# Patient Record
Sex: FEMALE | Race: WHITE | NOT HISPANIC OR LATINO | Employment: FULL TIME | URBAN - METROPOLITAN AREA
[De-identification: names, ages, dates, MRNs, and addresses within clinical notes are randomized per-mention and may not be internally consistent; named-entity substitution may affect disease eponyms.]

---

## 2017-01-28 ENCOUNTER — COMMUNICATION - HEALTHEAST (OUTPATIENT)
Dept: FAMILY MEDICINE | Facility: CLINIC | Age: 32
End: 2017-01-28

## 2017-01-28 DIAGNOSIS — G47.00 INSOMNIA: ICD-10-CM

## 2017-02-20 ENCOUNTER — OFFICE VISIT - HEALTHEAST (OUTPATIENT)
Dept: FAMILY MEDICINE | Facility: CLINIC | Age: 32
End: 2017-02-20

## 2017-02-20 DIAGNOSIS — G47.00 INSOMNIA: ICD-10-CM

## 2017-02-20 DIAGNOSIS — M71.9 BURSITIS: ICD-10-CM

## 2017-02-20 ASSESSMENT — MIFFLIN-ST. JEOR: SCORE: 1477.86

## 2017-03-29 ENCOUNTER — COMMUNICATION - HEALTHEAST (OUTPATIENT)
Dept: FAMILY MEDICINE | Facility: CLINIC | Age: 32
End: 2017-03-29

## 2017-03-29 DIAGNOSIS — F41.9 ANXIETY: ICD-10-CM

## 2017-03-29 DIAGNOSIS — G47.00 INSOMNIA: ICD-10-CM

## 2017-06-03 ENCOUNTER — COMMUNICATION - HEALTHEAST (OUTPATIENT)
Dept: FAMILY MEDICINE | Facility: CLINIC | Age: 32
End: 2017-06-03

## 2017-06-03 DIAGNOSIS — G47.00 INSOMNIA: ICD-10-CM

## 2017-06-28 ENCOUNTER — OFFICE VISIT - HEALTHEAST (OUTPATIENT)
Dept: FAMILY MEDICINE | Facility: CLINIC | Age: 32
End: 2017-06-28

## 2017-06-28 DIAGNOSIS — Z91.09 ENVIRONMENTAL ALLERGIES: ICD-10-CM

## 2017-06-28 DIAGNOSIS — F41.9 ANXIETY: ICD-10-CM

## 2017-07-05 ENCOUNTER — OFFICE VISIT - HEALTHEAST (OUTPATIENT)
Dept: PODIATRY | Facility: CLINIC | Age: 32
End: 2017-07-05

## 2017-07-05 DIAGNOSIS — B07.0 VERRUCA PLANTARIS: ICD-10-CM

## 2017-07-10 ENCOUNTER — COMMUNICATION - HEALTHEAST (OUTPATIENT)
Dept: FAMILY MEDICINE | Facility: CLINIC | Age: 32
End: 2017-07-10

## 2017-07-10 DIAGNOSIS — B00.1 RECURRENT COLD SORES: ICD-10-CM

## 2017-07-14 ENCOUNTER — OFFICE VISIT - HEALTHEAST (OUTPATIENT)
Dept: ALLERGY | Facility: CLINIC | Age: 32
End: 2017-07-14

## 2017-07-14 DIAGNOSIS — J30.89 ALLERGIC RHINITIS DUE TO OTHER ALLERGEN: ICD-10-CM

## 2017-07-14 ASSESSMENT — MIFFLIN-ST. JEOR: SCORE: 1380.35

## 2017-08-26 ENCOUNTER — COMMUNICATION - HEALTHEAST (OUTPATIENT)
Dept: FAMILY MEDICINE | Facility: CLINIC | Age: 32
End: 2017-08-26

## 2017-08-26 DIAGNOSIS — G47.00 INSOMNIA: ICD-10-CM

## 2017-09-24 ENCOUNTER — HEALTH MAINTENANCE LETTER (OUTPATIENT)
Age: 32
End: 2017-09-24

## 2018-01-16 ENCOUNTER — OFFICE VISIT - HEALTHEAST (OUTPATIENT)
Dept: FAMILY MEDICINE | Facility: CLINIC | Age: 33
End: 2018-01-16

## 2018-01-16 DIAGNOSIS — J02.9 SORETHROAT: ICD-10-CM

## 2018-01-16 DIAGNOSIS — J11.1 INFLUENZA: ICD-10-CM

## 2018-01-16 DIAGNOSIS — M54.9 BACK PAIN: ICD-10-CM

## 2018-01-16 LAB
DEPRECATED S PYO AG THROAT QL EIA: NORMAL
FLUAV AG SPEC QL IA: ABNORMAL
FLUBV AG SPEC QL IA: ABNORMAL

## 2018-01-17 LAB — GROUP A STREP BY PCR: NORMAL

## 2018-03-22 ENCOUNTER — COMMUNICATION - HEALTHEAST (OUTPATIENT)
Dept: FAMILY MEDICINE | Facility: CLINIC | Age: 33
End: 2018-03-22

## 2018-03-22 DIAGNOSIS — B00.1 RECURRENT COLD SORES: ICD-10-CM

## 2018-04-27 ENCOUNTER — OFFICE VISIT - HEALTHEAST (OUTPATIENT)
Dept: FAMILY MEDICINE | Facility: CLINIC | Age: 33
End: 2018-04-27

## 2018-04-27 DIAGNOSIS — G47.00 INSOMNIA: ICD-10-CM

## 2018-04-27 DIAGNOSIS — F41.9 ANXIETY: ICD-10-CM

## 2018-04-27 DIAGNOSIS — J34.89 SINUS PRESSURE: ICD-10-CM

## 2018-04-27 DIAGNOSIS — R09.81 NASAL CONGESTION: ICD-10-CM

## 2018-05-08 ENCOUNTER — COMMUNICATION - HEALTHEAST (OUTPATIENT)
Dept: FAMILY MEDICINE | Facility: CLINIC | Age: 33
End: 2018-05-08

## 2018-05-08 DIAGNOSIS — G47.00 INSOMNIA: ICD-10-CM

## 2018-05-08 DIAGNOSIS — F41.9 ANXIETY: ICD-10-CM

## 2018-08-02 ENCOUNTER — RECORDS - HEALTHEAST (OUTPATIENT)
Dept: GENERAL RADIOLOGY | Facility: CLINIC | Age: 33
End: 2018-08-02

## 2018-08-02 ENCOUNTER — OFFICE VISIT - HEALTHEAST (OUTPATIENT)
Dept: FAMILY MEDICINE | Facility: CLINIC | Age: 33
End: 2018-08-02

## 2018-08-02 ENCOUNTER — RECORDS - HEALTHEAST (OUTPATIENT)
Dept: ADMINISTRATIVE | Facility: OTHER | Age: 33
End: 2018-08-02

## 2018-08-02 DIAGNOSIS — G47.00 INSOMNIA: ICD-10-CM

## 2018-08-02 DIAGNOSIS — S69.90XA UNSPECIFIED INJURY OF UNSPECIFIED WRIST, HAND AND FINGER(S), INITIAL ENCOUNTER: ICD-10-CM

## 2018-08-02 DIAGNOSIS — S69.90XA FINGER INJURY: ICD-10-CM

## 2018-08-02 DIAGNOSIS — N39.0 RECURRENT UTI: ICD-10-CM

## 2018-08-02 DIAGNOSIS — S62.609A FRACTURE OF PHALANX OF FINGER: ICD-10-CM

## 2018-08-02 DIAGNOSIS — F41.9 ANXIETY: ICD-10-CM

## 2018-09-24 ENCOUNTER — COMMUNICATION - HEALTHEAST (OUTPATIENT)
Dept: SCHEDULING | Facility: CLINIC | Age: 33
End: 2018-09-24

## 2018-10-01 ENCOUNTER — OFFICE VISIT - HEALTHEAST (OUTPATIENT)
Dept: FAMILY MEDICINE | Facility: CLINIC | Age: 33
End: 2018-10-01

## 2018-10-01 ENCOUNTER — COMMUNICATION - HEALTHEAST (OUTPATIENT)
Dept: FAMILY MEDICINE | Facility: CLINIC | Age: 33
End: 2018-10-01

## 2018-10-01 DIAGNOSIS — R06.00 DYSPNEA: ICD-10-CM

## 2018-10-01 DIAGNOSIS — R19.7 DIARRHEA: ICD-10-CM

## 2018-10-01 LAB
ALBUMIN SERPL-MCNC: 4.2 G/DL (ref 3.5–5)
ALP SERPL-CCNC: 32 U/L (ref 45–120)
ALT SERPL W P-5'-P-CCNC: 11 U/L (ref 0–45)
ANION GAP SERPL CALCULATED.3IONS-SCNC: 7 MMOL/L (ref 5–18)
AST SERPL W P-5'-P-CCNC: 12 U/L (ref 0–40)
BILIRUB SERPL-MCNC: 2 MG/DL (ref 0–1)
BUN SERPL-MCNC: 19 MG/DL (ref 8–22)
CALCIUM SERPL-MCNC: 10 MG/DL (ref 8.5–10.5)
CHLORIDE BLD-SCNC: 106 MMOL/L (ref 98–107)
CO2 SERPL-SCNC: 27 MMOL/L (ref 22–31)
CREAT SERPL-MCNC: 0.79 MG/DL (ref 0.6–1.1)
D DIMER PPP FEU-MCNC: <=0.27 FEU UG/ML
ERYTHROCYTE [DISTWIDTH] IN BLOOD BY AUTOMATED COUNT: 11.3 % (ref 11–14.5)
FERRITIN SERPL-MCNC: 97 NG/ML (ref 10–130)
GFR SERPL CREATININE-BSD FRML MDRD: >60 ML/MIN/1.73M2
GLUCOSE BLD-MCNC: 115 MG/DL (ref 70–125)
HCT VFR BLD AUTO: 38.5 % (ref 35–47)
HGB BLD-MCNC: 12.5 G/DL (ref 12–16)
MCH RBC QN AUTO: 24.7 PG (ref 27–34)
MCHC RBC AUTO-ENTMCNC: 32.4 G/DL (ref 32–36)
MCV RBC AUTO: 76 FL (ref 80–100)
PLATELET # BLD AUTO: 226 THOU/UL (ref 140–440)
PMV BLD AUTO: 7.4 FL (ref 7–10)
POTASSIUM BLD-SCNC: 4.3 MMOL/L (ref 3.5–5)
PROT SERPL-MCNC: 7.1 G/DL (ref 6–8)
RBC # BLD AUTO: 5.05 MILL/UL (ref 3.8–5.4)
SODIUM SERPL-SCNC: 140 MMOL/L (ref 136–145)
T4 FREE SERPL-MCNC: 1.1 NG/DL (ref 0.7–1.8)
TSH SERPL DL<=0.005 MIU/L-ACNC: 1.22 UIU/ML (ref 0.3–5)
WBC: 8.3 THOU/UL (ref 4–11)

## 2018-10-02 ENCOUNTER — COMMUNICATION - HEALTHEAST (OUTPATIENT)
Dept: FAMILY MEDICINE | Facility: CLINIC | Age: 33
End: 2018-10-02

## 2018-10-03 ENCOUNTER — COMMUNICATION - HEALTHEAST (OUTPATIENT)
Dept: FAMILY MEDICINE | Facility: CLINIC | Age: 33
End: 2018-10-03

## 2018-10-03 ENCOUNTER — AMBULATORY - HEALTHEAST (OUTPATIENT)
Dept: FAMILY MEDICINE | Facility: CLINIC | Age: 33
End: 2018-10-03

## 2018-10-03 DIAGNOSIS — F41.9 ANXIETY: ICD-10-CM

## 2018-10-08 ENCOUNTER — COMMUNICATION - HEALTHEAST (OUTPATIENT)
Dept: SCHEDULING | Facility: CLINIC | Age: 33
End: 2018-10-08

## 2018-10-10 ENCOUNTER — COMMUNICATION - HEALTHEAST (OUTPATIENT)
Dept: FAMILY MEDICINE | Facility: CLINIC | Age: 33
End: 2018-10-10

## 2018-10-10 DIAGNOSIS — F41.9 ANXIETY: ICD-10-CM

## 2018-10-30 ENCOUNTER — COMMUNICATION - HEALTHEAST (OUTPATIENT)
Dept: FAMILY MEDICINE | Facility: CLINIC | Age: 33
End: 2018-10-30

## 2018-10-30 DIAGNOSIS — F41.9 ANXIETY: ICD-10-CM

## 2018-11-06 ENCOUNTER — OFFICE VISIT - HEALTHEAST (OUTPATIENT)
Dept: FAMILY MEDICINE | Facility: CLINIC | Age: 33
End: 2018-11-06

## 2018-11-06 DIAGNOSIS — F41.9 ANXIETY: ICD-10-CM

## 2018-11-06 DIAGNOSIS — G47.00 INSOMNIA: ICD-10-CM

## 2018-11-06 DIAGNOSIS — F33.0 MILD RECURRENT MAJOR DEPRESSION (H): ICD-10-CM

## 2018-11-28 ENCOUNTER — COMMUNICATION - HEALTHEAST (OUTPATIENT)
Dept: FAMILY MEDICINE | Facility: CLINIC | Age: 33
End: 2018-11-28

## 2018-11-28 DIAGNOSIS — N39.0 RECURRENT UTI: ICD-10-CM

## 2018-12-11 ENCOUNTER — COMMUNICATION - HEALTHEAST (OUTPATIENT)
Dept: FAMILY MEDICINE | Facility: CLINIC | Age: 33
End: 2018-12-11

## 2018-12-11 DIAGNOSIS — F41.9 ANXIETY: ICD-10-CM

## 2018-12-26 ENCOUNTER — COMMUNICATION - HEALTHEAST (OUTPATIENT)
Dept: FAMILY MEDICINE | Facility: CLINIC | Age: 33
End: 2018-12-26

## 2018-12-26 DIAGNOSIS — F41.9 ANXIETY: ICD-10-CM

## 2019-01-23 ENCOUNTER — COMMUNICATION - HEALTHEAST (OUTPATIENT)
Dept: FAMILY MEDICINE | Facility: CLINIC | Age: 34
End: 2019-01-23

## 2019-01-23 DIAGNOSIS — G47.00 INSOMNIA: ICD-10-CM

## 2019-01-23 DIAGNOSIS — F41.9 ANXIETY: ICD-10-CM

## 2019-02-16 ENCOUNTER — COMMUNICATION - HEALTHEAST (OUTPATIENT)
Dept: FAMILY MEDICINE | Facility: CLINIC | Age: 34
End: 2019-02-16

## 2019-02-16 DIAGNOSIS — N39.0 RECURRENT UTI: ICD-10-CM

## 2019-03-01 ENCOUNTER — COMMUNICATION - HEALTHEAST (OUTPATIENT)
Dept: FAMILY MEDICINE | Facility: CLINIC | Age: 34
End: 2019-03-01

## 2019-03-01 DIAGNOSIS — F41.9 ANXIETY: ICD-10-CM

## 2019-03-04 ENCOUNTER — COMMUNICATION - HEALTHEAST (OUTPATIENT)
Dept: FAMILY MEDICINE | Facility: CLINIC | Age: 34
End: 2019-03-04

## 2019-03-04 DIAGNOSIS — F41.9 ANXIETY: ICD-10-CM

## 2019-03-30 ENCOUNTER — COMMUNICATION - HEALTHEAST (OUTPATIENT)
Dept: FAMILY MEDICINE | Facility: CLINIC | Age: 34
End: 2019-03-30

## 2019-03-30 DIAGNOSIS — F41.9 ANXIETY: ICD-10-CM

## 2019-04-03 ENCOUNTER — COMMUNICATION - HEALTHEAST (OUTPATIENT)
Dept: FAMILY MEDICINE | Facility: CLINIC | Age: 34
End: 2019-04-03

## 2019-04-03 ENCOUNTER — OFFICE VISIT - HEALTHEAST (OUTPATIENT)
Dept: FAMILY MEDICINE | Facility: CLINIC | Age: 34
End: 2019-04-03

## 2019-04-03 DIAGNOSIS — F51.01 PRIMARY INSOMNIA: ICD-10-CM

## 2019-04-03 DIAGNOSIS — Z13.1 SCREENING FOR DIABETES MELLITUS: ICD-10-CM

## 2019-04-03 DIAGNOSIS — F33.0 MILD RECURRENT MAJOR DEPRESSION (H): ICD-10-CM

## 2019-04-03 DIAGNOSIS — F41.9 ANXIETY: ICD-10-CM

## 2019-04-03 DIAGNOSIS — Z13.220 SCREENING CHOLESTEROL LEVEL: ICD-10-CM

## 2019-04-03 DIAGNOSIS — K58.0 IRRITABLE BOWEL SYNDROME WITH DIARRHEA: ICD-10-CM

## 2019-04-04 ENCOUNTER — COMMUNICATION - HEALTHEAST (OUTPATIENT)
Dept: FAMILY MEDICINE | Facility: CLINIC | Age: 34
End: 2019-04-04

## 2019-04-04 ENCOUNTER — AMBULATORY - HEALTHEAST (OUTPATIENT)
Dept: LAB | Facility: CLINIC | Age: 34
End: 2019-04-04

## 2019-04-04 DIAGNOSIS — Z13.220 SCREENING CHOLESTEROL LEVEL: ICD-10-CM

## 2019-04-04 DIAGNOSIS — Z13.1 SCREENING FOR DIABETES MELLITUS: ICD-10-CM

## 2019-04-04 DIAGNOSIS — K58.0 IRRITABLE BOWEL SYNDROME WITH DIARRHEA: ICD-10-CM

## 2019-04-04 LAB
FASTING STATUS PATIENT QL REPORTED: YES
GLUCOSE BLD-MCNC: 87 MG/DL (ref 70–99)

## 2019-04-05 LAB
CHOLEST SERPL-MCNC: 172 MG/DL
FASTING STATUS PATIENT QL REPORTED: YES
HDLC SERPL-MCNC: 87 MG/DL
LDLC SERPL CALC-MCNC: 75 MG/DL
TRIGL SERPL-MCNC: 50 MG/DL

## 2019-04-08 ENCOUNTER — COMMUNICATION - HEALTHEAST (OUTPATIENT)
Dept: FAMILY MEDICINE | Facility: CLINIC | Age: 34
End: 2019-04-08

## 2019-04-08 LAB
GLIADIN IGA SER-ACNC: 0.7 U/ML
GLIADIN IGG SER-ACNC: <0.4 U/ML
IGA SERPL-MCNC: 84 MG/DL (ref 65–400)
TTG IGA SER-ACNC: <0.1 U/ML
TTG IGG SER-ACNC: <0.6 U/ML

## 2019-04-18 ENCOUNTER — COMMUNICATION - HEALTHEAST (OUTPATIENT)
Dept: FAMILY MEDICINE | Facility: CLINIC | Age: 34
End: 2019-04-18

## 2019-04-18 DIAGNOSIS — F41.9 ANXIETY: ICD-10-CM

## 2019-04-27 ENCOUNTER — COMMUNICATION - HEALTHEAST (OUTPATIENT)
Dept: FAMILY MEDICINE | Facility: CLINIC | Age: 34
End: 2019-04-27

## 2019-04-27 DIAGNOSIS — F41.9 ANXIETY: ICD-10-CM

## 2019-05-08 ENCOUNTER — COMMUNICATION - HEALTHEAST (OUTPATIENT)
Dept: FAMILY MEDICINE | Facility: CLINIC | Age: 34
End: 2019-05-08

## 2019-05-08 DIAGNOSIS — N39.0 RECURRENT UTI: ICD-10-CM

## 2019-06-03 ENCOUNTER — COMMUNICATION - HEALTHEAST (OUTPATIENT)
Dept: FAMILY MEDICINE | Facility: CLINIC | Age: 34
End: 2019-06-03

## 2019-06-03 DIAGNOSIS — F41.9 ANXIETY: ICD-10-CM

## 2019-07-10 ENCOUNTER — OFFICE VISIT - HEALTHEAST (OUTPATIENT)
Dept: PODIATRY | Facility: CLINIC | Age: 34
End: 2019-07-10

## 2019-07-10 DIAGNOSIS — D22.70 MELANOCYTIC NEVUS OF LOWER EXTREMITY, UNSPECIFIED LATERALITY: ICD-10-CM

## 2019-07-10 ASSESSMENT — MIFFLIN-ST. JEOR: SCORE: 1260.14

## 2019-07-24 ENCOUNTER — RECORDS - HEALTHEAST (OUTPATIENT)
Dept: ADMINISTRATIVE | Facility: OTHER | Age: 34
End: 2019-07-24

## 2019-07-26 ENCOUNTER — COMMUNICATION - HEALTHEAST (OUTPATIENT)
Dept: FAMILY MEDICINE | Facility: CLINIC | Age: 34
End: 2019-07-26

## 2019-07-26 ENCOUNTER — HOSPITAL ENCOUNTER (OUTPATIENT)
Dept: ULTRASOUND IMAGING | Facility: CLINIC | Age: 34
Discharge: HOME | End: 2019-07-26
Attending: OBSTETRICS & GYNECOLOGY

## 2019-07-26 ENCOUNTER — HOSPITAL ENCOUNTER (OUTPATIENT)
Dept: MAMMOGRAPHY | Facility: CLINIC | Age: 34
Setting detail: RADIATION/ONCOLOGY SERIES
Discharge: STILL ADMITTED OR ADMINISTRATIVE | End: 2019-07-26
Attending: OBSTETRICS & GYNECOLOGY

## 2019-07-26 DIAGNOSIS — N64.4 MASTALGIA: ICD-10-CM

## 2019-07-26 DIAGNOSIS — N39.0 RECURRENT UTI: ICD-10-CM

## 2019-07-26 DIAGNOSIS — N63.0 BREAST NODULE: ICD-10-CM

## 2019-08-13 ENCOUNTER — COMMUNICATION - HEALTHEAST (OUTPATIENT)
Dept: PODIATRY | Facility: CLINIC | Age: 34
End: 2019-08-13

## 2019-11-09 ENCOUNTER — HEALTH MAINTENANCE LETTER (OUTPATIENT)
Age: 34
End: 2019-11-09

## 2019-11-13 ENCOUNTER — COMMUNICATION - HEALTHEAST (OUTPATIENT)
Dept: FAMILY MEDICINE | Facility: CLINIC | Age: 34
End: 2019-11-13

## 2019-11-13 DIAGNOSIS — N39.0 RECURRENT UTI: ICD-10-CM

## 2019-12-01 ENCOUNTER — COMMUNICATION - HEALTHEAST (OUTPATIENT)
Dept: FAMILY MEDICINE | Facility: CLINIC | Age: 34
End: 2019-12-01

## 2019-12-01 DIAGNOSIS — N39.0 RECURRENT UTI: ICD-10-CM

## 2020-01-11 ENCOUNTER — COMMUNICATION - HEALTHEAST (OUTPATIENT)
Dept: FAMILY MEDICINE | Facility: CLINIC | Age: 35
End: 2020-01-11

## 2020-01-11 DIAGNOSIS — F41.9 ANXIETY: ICD-10-CM

## 2020-01-27 ENCOUNTER — OFFICE VISIT - HEALTHEAST (OUTPATIENT)
Dept: FAMILY MEDICINE | Facility: CLINIC | Age: 35
End: 2020-01-27

## 2020-01-27 DIAGNOSIS — J02.9 SORETHROAT: ICD-10-CM

## 2020-01-27 DIAGNOSIS — M25.571 PAIN IN JOINT, ANKLE AND FOOT, RIGHT: ICD-10-CM

## 2020-01-27 LAB — DEPRECATED S PYO AG THROAT QL EIA: NORMAL

## 2020-01-27 ASSESSMENT — MIFFLIN-ST. JEOR: SCORE: 1271.48

## 2020-01-28 LAB — GROUP A STREP BY PCR: NORMAL

## 2020-02-20 ENCOUNTER — COMMUNICATION - HEALTHEAST (OUTPATIENT)
Dept: FAMILY MEDICINE | Facility: CLINIC | Age: 35
End: 2020-02-20

## 2020-02-20 DIAGNOSIS — N39.0 RECURRENT UTI: ICD-10-CM

## 2020-02-23 ENCOUNTER — HEALTH MAINTENANCE LETTER (OUTPATIENT)
Age: 35
End: 2020-02-23

## 2020-03-04 ENCOUNTER — COMMUNICATION - HEALTHEAST (OUTPATIENT)
Dept: FAMILY MEDICINE | Facility: CLINIC | Age: 35
End: 2020-03-04

## 2020-04-19 ENCOUNTER — COMMUNICATION - HEALTHEAST (OUTPATIENT)
Dept: FAMILY MEDICINE | Facility: CLINIC | Age: 35
End: 2020-04-19

## 2020-04-19 DIAGNOSIS — F41.9 ANXIETY: ICD-10-CM

## 2020-04-28 ENCOUNTER — COMMUNICATION - HEALTHEAST (OUTPATIENT)
Dept: FAMILY MEDICINE | Facility: CLINIC | Age: 35
End: 2020-04-28

## 2020-05-05 ENCOUNTER — OFFICE VISIT - HEALTHEAST (OUTPATIENT)
Dept: FAMILY MEDICINE | Facility: CLINIC | Age: 35
End: 2020-05-05

## 2020-05-05 DIAGNOSIS — L50.9 HIVES: ICD-10-CM

## 2020-05-05 LAB
ANION GAP SERPL CALCULATED.3IONS-SCNC: 8 MMOL/L (ref 5–18)
BUN SERPL-MCNC: 17 MG/DL (ref 8–22)
CALCIUM SERPL-MCNC: 9.8 MG/DL (ref 8.5–10.5)
CHLORIDE BLD-SCNC: 98 MMOL/L (ref 98–107)
CO2 SERPL-SCNC: 31 MMOL/L (ref 22–31)
CREAT SERPL-MCNC: 0.82 MG/DL (ref 0.6–1.1)
ERYTHROCYTE [DISTWIDTH] IN BLOOD BY AUTOMATED COUNT: 12.8 % (ref 11–14.5)
GFR SERPL CREATININE-BSD FRML MDRD: >60 ML/MIN/1.73M2
GLUCOSE BLD-MCNC: 67 MG/DL (ref 70–125)
HCT VFR BLD AUTO: 40.1 % (ref 35–47)
HGB BLD-MCNC: 12.8 G/DL (ref 12–16)
MCH RBC QN AUTO: 26.2 PG (ref 27–34)
MCHC RBC AUTO-ENTMCNC: 32 G/DL (ref 32–36)
MCV RBC AUTO: 82 FL (ref 80–100)
PLATELET # BLD AUTO: 283 THOU/UL (ref 140–440)
PMV BLD AUTO: 7.4 FL (ref 7–10)
POTASSIUM BLD-SCNC: 4.3 MMOL/L (ref 3.5–5)
RBC # BLD AUTO: 4.89 MILL/UL (ref 3.8–5.4)
SODIUM SERPL-SCNC: 137 MMOL/L (ref 136–145)
TSH SERPL DL<=0.005 MIU/L-ACNC: 1.46 UIU/ML (ref 0.3–5)
WBC: 7.5 THOU/UL (ref 4–11)

## 2020-05-06 ENCOUNTER — COMMUNICATION - HEALTHEAST (OUTPATIENT)
Dept: FAMILY MEDICINE | Facility: CLINIC | Age: 35
End: 2020-05-06

## 2020-05-08 ENCOUNTER — COMMUNICATION - HEALTHEAST (OUTPATIENT)
Dept: ADMINISTRATIVE | Facility: CLINIC | Age: 35
End: 2020-05-08

## 2020-05-11 ENCOUNTER — OFFICE VISIT - HEALTHEAST (OUTPATIENT)
Dept: FAMILY MEDICINE | Facility: CLINIC | Age: 35
End: 2020-05-11

## 2020-05-11 DIAGNOSIS — Z71.1 WORRIED WELL: ICD-10-CM

## 2020-05-13 ENCOUNTER — COMMUNICATION - HEALTHEAST (OUTPATIENT)
Dept: FAMILY MEDICINE | Facility: CLINIC | Age: 35
End: 2020-05-13

## 2020-05-13 DIAGNOSIS — N39.0 RECURRENT UTI: ICD-10-CM

## 2020-06-01 ENCOUNTER — COMMUNICATION - HEALTHEAST (OUTPATIENT)
Dept: FAMILY MEDICINE | Facility: CLINIC | Age: 35
End: 2020-06-01

## 2020-06-01 DIAGNOSIS — L50.9 HIVES: ICD-10-CM

## 2020-07-28 ENCOUNTER — COMMUNICATION - HEALTHEAST (OUTPATIENT)
Dept: FAMILY MEDICINE | Facility: CLINIC | Age: 35
End: 2020-07-28

## 2020-07-28 DIAGNOSIS — L50.9 HIVES: ICD-10-CM

## 2020-07-31 ENCOUNTER — COMMUNICATION - HEALTHEAST (OUTPATIENT)
Dept: FAMILY MEDICINE | Facility: CLINIC | Age: 35
End: 2020-07-31

## 2020-07-31 DIAGNOSIS — L50.9 HIVES: ICD-10-CM

## 2020-08-01 ENCOUNTER — COMMUNICATION - HEALTHEAST (OUTPATIENT)
Dept: FAMILY MEDICINE | Facility: CLINIC | Age: 35
End: 2020-08-01

## 2020-08-01 DIAGNOSIS — N39.0 RECURRENT UTI: ICD-10-CM

## 2020-08-07 ENCOUNTER — COMMUNICATION - HEALTHEAST (OUTPATIENT)
Dept: FAMILY MEDICINE | Facility: CLINIC | Age: 35
End: 2020-08-07

## 2020-08-10 ENCOUNTER — OFFICE VISIT - HEALTHEAST (OUTPATIENT)
Dept: FAMILY MEDICINE | Facility: CLINIC | Age: 35
End: 2020-08-10

## 2020-08-10 DIAGNOSIS — G89.29 CHRONIC BILATERAL LOW BACK PAIN WITHOUT SCIATICA: ICD-10-CM

## 2020-08-10 DIAGNOSIS — M54.50 CHRONIC BILATERAL LOW BACK PAIN WITHOUT SCIATICA: ICD-10-CM

## 2020-08-18 ENCOUNTER — COMMUNICATION - HEALTHEAST (OUTPATIENT)
Dept: FAMILY MEDICINE | Facility: CLINIC | Age: 35
End: 2020-08-18

## 2020-08-18 DIAGNOSIS — L50.9 HIVES: ICD-10-CM

## 2020-11-03 ENCOUNTER — COMMUNICATION - HEALTHEAST (OUTPATIENT)
Dept: FAMILY MEDICINE | Facility: CLINIC | Age: 35
End: 2020-11-03

## 2020-11-03 DIAGNOSIS — N39.0 RECURRENT UTI: ICD-10-CM

## 2020-11-09 ENCOUNTER — COMMUNICATION - HEALTHEAST (OUTPATIENT)
Dept: FAMILY MEDICINE | Facility: CLINIC | Age: 35
End: 2020-11-09

## 2020-11-09 DIAGNOSIS — F41.9 ANXIETY: ICD-10-CM

## 2020-12-06 ENCOUNTER — HEALTH MAINTENANCE LETTER (OUTPATIENT)
Age: 35
End: 2020-12-06

## 2021-01-22 ENCOUNTER — COMMUNICATION - HEALTHEAST (OUTPATIENT)
Dept: FAMILY MEDICINE | Facility: CLINIC | Age: 36
End: 2021-01-22

## 2021-01-22 DIAGNOSIS — L50.9 HIVES: ICD-10-CM

## 2021-01-28 ENCOUNTER — OFFICE VISIT - HEALTHEAST (OUTPATIENT)
Dept: FAMILY MEDICINE | Facility: CLINIC | Age: 36
End: 2021-01-28

## 2021-01-28 DIAGNOSIS — F41.9 ANXIETY: ICD-10-CM

## 2021-01-28 DIAGNOSIS — F51.01 PRIMARY INSOMNIA: ICD-10-CM

## 2021-01-28 DIAGNOSIS — B00.1 RECURRENT COLD SORES: ICD-10-CM

## 2021-01-28 DIAGNOSIS — F33.0 MILD RECURRENT MAJOR DEPRESSION (H): ICD-10-CM

## 2021-01-28 DIAGNOSIS — L50.9 HIVES: ICD-10-CM

## 2021-01-28 DIAGNOSIS — N39.0 RECURRENT UTI: ICD-10-CM

## 2021-01-28 ASSESSMENT — ANXIETY QUESTIONNAIRES
1. FEELING NERVOUS, ANXIOUS, OR ON EDGE: NOT AT ALL
6. BECOMING EASILY ANNOYED OR IRRITABLE: SEVERAL DAYS
GAD7 TOTAL SCORE: 3
2. NOT BEING ABLE TO STOP OR CONTROL WORRYING: NOT AT ALL
7. FEELING AFRAID AS IF SOMETHING AWFUL MIGHT HAPPEN: NOT AT ALL
4. TROUBLE RELAXING: SEVERAL DAYS
IF YOU CHECKED OFF ANY PROBLEMS ON THIS QUESTIONNAIRE, HOW DIFFICULT HAVE THESE PROBLEMS MADE IT FOR YOU TO DO YOUR WORK, TAKE CARE OF THINGS AT HOME, OR GET ALONG WITH OTHER PEOPLE: NOT DIFFICULT AT ALL
3. WORRYING TOO MUCH ABOUT DIFFERENT THINGS: SEVERAL DAYS
5. BEING SO RESTLESS THAT IT IS HARD TO SIT STILL: NOT AT ALL

## 2021-01-28 ASSESSMENT — PATIENT HEALTH QUESTIONNAIRE - PHQ9: SUM OF ALL RESPONSES TO PHQ QUESTIONS 1-9: 0

## 2021-02-12 ENCOUNTER — COMMUNICATION - HEALTHEAST (OUTPATIENT)
Dept: FAMILY MEDICINE | Facility: CLINIC | Age: 36
End: 2021-02-12

## 2021-02-12 DIAGNOSIS — N39.0 RECURRENT UTI: ICD-10-CM

## 2021-03-01 ENCOUNTER — COMMUNICATION - HEALTHEAST (OUTPATIENT)
Dept: FAMILY MEDICINE | Facility: CLINIC | Age: 36
End: 2021-03-01

## 2021-03-01 ENCOUNTER — OFFICE VISIT - HEALTHEAST (OUTPATIENT)
Dept: FAMILY MEDICINE | Facility: CLINIC | Age: 36
End: 2021-03-01

## 2021-03-01 DIAGNOSIS — T50.Z95A ADVERSE EFFECT OF VACCINE, INITIAL ENCOUNTER: ICD-10-CM

## 2021-03-31 ENCOUNTER — TRANSFERRED RECORDS (OUTPATIENT)
Dept: MULTI SPECIALTY CLINIC | Facility: CLINIC | Age: 36
End: 2021-03-31
Payer: COMMERCIAL

## 2021-03-31 LAB
HPV ABSTRACT: NORMAL
PAP-ABSTRACT: NORMAL

## 2021-05-27 ASSESSMENT — PATIENT HEALTH QUESTIONNAIRE - PHQ9: SUM OF ALL RESPONSES TO PHQ QUESTIONS 1-9: 0

## 2021-05-27 NOTE — PROGRESS NOTES
ASSESSMENT/PLAN:    Anxiety, Mild recurrent major depression (H)  33-year-old female with anxiety and depression comes in today with concerns regarding news regarding family history and results from 23 and me testing.  She will continue with Effexor 75 mg.  Motivational interviewing was utilized today.  I emphasized focusing on healthy lifestyle modification and a positive thinking rather than the potential health risks based on recent news of her biological father and that of the 23 and the results.  The patient will however, come back for fasting labs and that of celiac panel screen    Primary insomnia  Stable on hydroxyzine 50-75 mg at bedtime    SUBJECTIVE:    Priyanka Madsen is a 33 y.o. female who came in today to discuss news about family history.  Patient received news that her current father is not her biological father.  She discovered celiac and coronary artery disease in her biological father.  Her 23 me results also showed hereditary thrombophilia.  All these news have caused her to be upset, angry, and anxious about her own health and that of her children.  She does have underlying anxiety and depression.  She currently takes Effexor 75 mg.  She also has been taking hydroxyzine 50-75 mg at bedtime for sleep.  The patient has had history of gastrointestinal symptoms such as excessive gas where she takes Gas-X.  She also has occasional diarrhea about once a month.  She also feels bloated after eating pasta.  She does not eat dairy.  The symptoms have led her to want to be tested for celiac as it is known in her biological father.    Gas 7 score of 1 and PHQ 9 score of 2    Coronary artery disease is prevalent in her biological father and paternal family history.  The patient is not on any heart medications.  She is worried about heart disease and wondering what she can do to help prevent or address heart disease for herself.    The patient enrolled and 23 me testing and found that hereditary  thrombophilia was significant.  She does not no family history with said condition.  She is wondering if there are any testing for her.  She has not had any bruising or bleeding symptoms.  No clotting history.    Review of Systems (except those mentioned above)  Constitutional: Negative.   HENT: Negative.   Eyes: Negative.   Respiratory: Negative.   Cardiovascular: Negative.   Gastrointestinal: Negative.   Endocrine: Negative.   Genitourinary: Negative.   Musculoskeletal: Negative.   Skin: Negative.   Allergic/Immunologic: Negative.   Neurological: Negative.   Hematological: Negative.   Psychiatric/Behavioral: Negative.     Patient Active Problem List    Diagnosis Date Noted     Mild recurrent major depression (H) 2018     Insomnia 2016     Anxiety 2014     Allergies   Allergen Reactions     Cat Dander Other (See Comments)     Nasal congestion and swelling     Latex Itching     Other Allergy (See Comments) Other (See Comments)     Nasal congestion and swelling     Current Outpatient Medications   Medication Sig Dispense Refill     diphenhydrAMINE (BENADRYL) 25 mg capsule Take 50 mg by mouth as needed for itching.       hydrOXYzine pamoate (VISTARIL) 25 MG capsule TAKE 1 TO 4 CAPSULES BY MOUTH AT BEDTIME 360 capsule 1     multivitamin with minerals (THERA-M) 9 mg iron-400 mcg Tab tablet Take 1 tablet by mouth daily.       nitrofurantoin (MACRODANTIN) 100 MG capsule TAKE 1 CAPSULE FOLLOWING   INTERCOURSE TO PREVENT     URINARY TRACT INFECTION 90 capsule 0     valACYclovir (VALTREX) 1000 MG tablet Take one tablet daily 30 tablet 1     venlafaxine (EFFEXOR-XR) 75 MG 24 hr capsule TAKE 1 CAPSULE BY MOUTH EVERY DAY 30 capsule 0     No current facility-administered medications for this visit.      No past medical history on file.  Past Surgical History:   Procedure Laterality Date      SECTION       plantar warts       Social History     Socioeconomic History     Marital status:      Spouse  name: None     Number of children: None     Years of education: None     Highest education level: None   Occupational History     None   Social Needs     Financial resource strain: None     Food insecurity:     Worry: None     Inability: None     Transportation needs:     Medical: None     Non-medical: None   Tobacco Use     Smoking status: Never Smoker     Smokeless tobacco: Never Used   Substance and Sexual Activity     Alcohol use: Yes     Drug use: None     Sexual activity: None   Lifestyle     Physical activity:     Days per week: None     Minutes per session: None     Stress: None   Relationships     Social connections:     Talks on phone: None     Gets together: None     Attends Jew service: None     Active member of club or organization: None     Attends meetings of clubs or organizations: None     Relationship status: None     Intimate partner violence:     Fear of current or ex partner: None     Emotionally abused: None     Physically abused: None     Forced sexual activity: None   Other Topics Concern     None   Social History Narrative     None     Family History   Problem Relation Age of Onset     Thyroid disease Mother      Thyroid disease Sister      Thyroid disease Maternal Aunt      Thyroid disease Maternal Grandmother          OBJECTIVE:    Vitals:    04/03/19 0848   BP: 98/64   Patient Site: Left Arm   Patient Position: Sitting   Cuff Size: Adult Regular   Pulse: 84   Weight: 129 lb 7 oz (58.7 kg)     Body mass index is 21.54 kg/m .    Physical Exam:  Constitutional: Patient is oriented to person, place, and time. Patient appears well-developed and well-nourished. No distress.   Head: Normocephalic and atraumatic.   Right Ear: External ear normal.   Left Ear: External ear normal.   Nose: Nose normal.   Mouth/Throat: Oropharynx is clear and moist. No oropharyngeal exudate.   Eyes: Conjunctivae and EOM are normal. Right eye exhibits no discharge. Left eye exhibits no discharge. No scleral  icterus.   Neurological: Patient is alert and oriented to person, place, and time. Patient has normal reflexes. No cranial nerve deficit. Coordination normal.   Skin: No rash noted. Patient is not diaphoretic. No erythema. No pallor.  The patient has good eye contact.  No psychomotor retardation or stereotypical behaviors.  Speech was regular rate, regular rhythm, adequate responses.  Mood was anxious and affect was congruent mood.  No suicidal or homicidal intent.  No hallucination.        Results for orders placed or performed in visit on 10/01/18   HM2(CBC w/o Differential)   Result Value Ref Range    WBC 8.3 4.0 - 11.0 thou/uL    RBC 5.05 3.80 - 5.40 mill/uL    Hemoglobin 12.5 12.0 - 16.0 g/dL    Hematocrit 38.5 35.0 - 47.0 %    MCV 76 (L) 80 - 100 fL    MCH 24.7 (L) 27.0 - 34.0 pg    MCHC 32.4 32.0 - 36.0 g/dL    RDW 11.3 11.0 - 14.5 %    Platelets 226 140 - 440 thou/uL    MPV 7.4 7.0 - 10.0 fL   Comprehensive Metabolic Panel   Result Value Ref Range    Sodium 140 136 - 145 mmol/L    Potassium 4.3 3.5 - 5.0 mmol/L    Chloride 106 98 - 107 mmol/L    CO2 27 22 - 31 mmol/L    Anion Gap, Calculation 7 5 - 18 mmol/L    Glucose 115 70 - 125 mg/dL    BUN 19 8 - 22 mg/dL    Creatinine 0.79 0.60 - 1.10 mg/dL    GFR MDRD Af Amer >60 >60 mL/min/1.73m2    GFR MDRD Non Af Amer >60 >60 mL/min/1.73m2    Bilirubin, Total 2.0 (H) 0.0 - 1.0 mg/dL    Calcium 10.0 8.5 - 10.5 mg/dL    Protein, Total 7.1 6.0 - 8.0 g/dL    Albumin 4.2 3.5 - 5.0 g/dL    Alkaline Phosphatase 32 (L) 45 - 120 U/L    AST 12 0 - 40 U/L    ALT 11 0 - 45 U/L   Ferritin   Result Value Ref Range    Ferritin 97 10 - 130 ng/mL   Thyroid Stimulating Hormone (TSH)   Result Value Ref Range    TSH 1.22 0.30 - 5.00 uIU/mL   T4, Free   Result Value Ref Range    Free T4 1.1 0.7 - 1.8 ng/dL   D-dimer, Quantitative   Result Value Ref Range    D-Dimer, Quant <=0.27 <=0.50 FEU ug/mL     A total of 25 minutes visit with over 50% of the time spent on counseling the patient.   Motivational interviewing was utilized today.  Modified cognitive behavioral therapy was performed with counseling on internal locus of control.

## 2021-05-27 NOTE — TELEPHONE ENCOUNTER
RN cannot approve Refill Request    RN can NOT refill this medication overdue for office visits and/or labs.    Bart Mckenzie, [facility] Triage/Med Refill 4/4/2019    Requested Prescriptions   Pending Prescriptions Disp Refills     venlafaxine (EFFEXOR-XR) 75 MG 24 hr capsule [Pharmacy Med Name: VENLAFAXINE HCL ER 75 MG CAP] 30 capsule 0     Sig: TAKE 1 CAPSULE BY MOUTH EVERY DAY    Venlafaxine/Desvenlafaxine Refill Protocol Failed - 4/3/2019 11:27 AM       Failed - Fasting lipid cascade in last year    No results found for: CHOL, TRIG, HDL, LDLCALC, FASTING         Passed - LFT or AST or ALT in last year    Albumin   Date Value Ref Range Status   10/01/2018 4.2 3.5 - 5.0 g/dL Final     Bilirubin, Total   Date Value Ref Range Status   10/01/2018 2.0 (H) 0.0 - 1.0 mg/dL Final     Alkaline Phosphatase   Date Value Ref Range Status   10/01/2018 32 (L) 45 - 120 U/L Final     AST   Date Value Ref Range Status   10/01/2018 12 0 - 40 U/L Final     ALT   Date Value Ref Range Status   10/01/2018 11 0 - 45 U/L Final     Protein, Total   Date Value Ref Range Status   10/01/2018 7.1 6.0 - 8.0 g/dL Final               Passed - PCP or prescribing provider visit in last year    Last office visit with prescriber/PCP: 4/3/2019 Catarino Earl MD OR same dept: 4/3/2019 Catarino Earl MD OR same specialty: 4/3/2019 Catarino Earl MD  Last physical: Visit date not found Last MTM visit: Visit date not found   Next visit within 3 mo: Visit date not found  Next physical within 3 mo: Visit date not found  Prescriber OR PCP: Catarino Walker MD  Last diagnosis associated with med order: 1. Anxiety  - venlafaxine (EFFEXOR-XR) 75 MG 24 hr capsule [Pharmacy Med Name: VENLAFAXINE HCL ER 75 MG CAP]; TAKE 1 CAPSULE BY MOUTH EVERY DAY  Dispense: 30 capsule; Refill: 0    If protocol passes may refill for 12 months if within 3 months of last provider visit (or a total of 15 months).             Passed - Blood Pressure in last year    BP Readings from Last 1 Encounters:   04/03/19 98/64

## 2021-05-27 NOTE — TELEPHONE ENCOUNTER
Left message on her voicemail regarding these condition.  Celiac, lipid, glucose results recently were unremarkable.  As far as CPT code, I asked her to forward the family history of blood clot disorder information to me

## 2021-05-27 NOTE — TELEPHONE ENCOUNTER
RN cannot approve Refill Request    RN can NOT refill this medication PCP messaged that patient is overdue for Labs.       Candy [facility], [facility] Triage/Med Refill 4/1/2019    Requested Prescriptions   Pending Prescriptions Disp Refills     venlafaxine (EFFEXOR-XR) 75 MG 24 hr capsule [Pharmacy Med Name: VENLAFAXINE HCL ER 75 MG CAP] 30 capsule 0     Sig: TAKE 1 CAPSULE BY MOUTH EVERY DAY    Venlafaxine/Desvenlafaxine Refill Protocol Failed - 3/30/2019  7:38 AM       Failed - Fasting lipid cascade in last year    No results found for: CHOL, TRIG, HDL, LDLCALC, FASTING         Passed - LFT or AST or ALT in last year    Albumin   Date Value Ref Range Status   10/01/2018 4.2 3.5 - 5.0 g/dL Final     Bilirubin, Total   Date Value Ref Range Status   10/01/2018 2.0 (H) 0.0 - 1.0 mg/dL Final     Alkaline Phosphatase   Date Value Ref Range Status   10/01/2018 32 (L) 45 - 120 U/L Final     AST   Date Value Ref Range Status   10/01/2018 12 0 - 40 U/L Final     ALT   Date Value Ref Range Status   10/01/2018 11 0 - 45 U/L Final     Protein, Total   Date Value Ref Range Status   10/01/2018 7.1 6.0 - 8.0 g/dL Final               Passed - PCP or prescribing provider visit in last year    Last office visit with prescriber/PCP: 11/6/2018 Catarino Earl MD OR same dept: 11/6/2018 Catarino Earl MD OR same specialty: 11/6/2018 Catarino Earl MD  Last physical: Visit date not found Last MTM visit: Visit date not found   Next visit within 3 mo: Visit date not found  Next physical within 3 mo: Visit date not found  Prescriber OR PCP: Catarino Walker MD  Last diagnosis associated with med order: 1. Anxiety  - venlafaxine (EFFEXOR-XR) 75 MG 24 hr capsule [Pharmacy Med Name: VENLAFAXINE HCL ER 75 MG CAP]; TAKE 1 CAPSULE BY MOUTH EVERY DAY  Dispense: 30 capsule; Refill: 0    If protocol passes may refill for 12 months if within 3 months of last provider visit (or a total of  15 months).            Passed - Blood Pressure in last year    BP Readings from Last 1 Encounters:   11/06/18 100/68

## 2021-05-28 ASSESSMENT — ANXIETY QUESTIONNAIRES: GAD7 TOTAL SCORE: 3

## 2021-05-28 NOTE — TELEPHONE ENCOUNTER
RN cannot approve Refill Request    RN can NOT refill this medication RX strength is different from listed- Provider please review. Last office visit: 4/3/2019 Catarino Earl MD Last Physical: Visit date not found Last MTM visit: Visit date not found Last visit same specialty: 4/3/2019 Catarino Earl MD.  Next visit within 3 mo: Visit date not found  Next physical within 3 mo: Visit date not found      [place] PADILLA Berenice, [facility] Triage/Med Refill 4/21/2019    Requested Prescriptions   Pending Prescriptions Disp Refills     venlafaxine (EFFEXOR-XR) 37.5 MG 24 hr capsule [Pharmacy Med Name: VENLAFAXINE HCL ER 37.5 MG CAP] 30 capsule 0     Sig: TAKE 1 CAPSULE BY MOUTH EVERY DAY       Venlafaxine/Desvenlafaxine Refill Protocol Passed - 4/18/2019  4:04 PM        Passed - LFT or AST or ALT in last year     Albumin   Date Value Ref Range Status   10/01/2018 4.2 3.5 - 5.0 g/dL Final     Bilirubin, Total   Date Value Ref Range Status   10/01/2018 2.0 (H) 0.0 - 1.0 mg/dL Final     Alkaline Phosphatase   Date Value Ref Range Status   10/01/2018 32 (L) 45 - 120 U/L Final     AST   Date Value Ref Range Status   10/01/2018 12 0 - 40 U/L Final     ALT   Date Value Ref Range Status   10/01/2018 11 0 - 45 U/L Final     Protein, Total   Date Value Ref Range Status   10/01/2018 7.1 6.0 - 8.0 g/dL Final                Passed - Fasting lipid cascade in last year     Cholesterol   Date Value Ref Range Status   04/04/2019 172 <=199 mg/dL Final     Triglycerides   Date Value Ref Range Status   04/04/2019 50 <=149 mg/dL Final     HDL Cholesterol   Date Value Ref Range Status   04/04/2019 87 >=50 mg/dL Final     LDL Calculated   Date Value Ref Range Status   04/04/2019 75 <=129 mg/dL Final     Patient Fasting > 8hrs?   Date Value Ref Range Status   04/04/2019 Yes  Final   04/04/2019 Yes  Final             Passed - PCP or prescribing provider visit in last year     Last office visit with prescriber/PCP:  4/3/2019 Catarino Earl MD OR same dept: 4/3/2019 Catarino Earl MD OR same specialty: 4/3/2019 Catarino Earl MD  Last physical: Visit date not found Last MTM visit: Visit date not found   Next visit within 3 mo: Visit date not found  Next physical within 3 mo: Visit date not found  Prescriber OR PCP: Catarino Walker MD  Last diagnosis associated with med order: 1. Anxiety  - venlafaxine (EFFEXOR-XR) 37.5 MG 24 hr capsule [Pharmacy Med Name: VENLAFAXINE HCL ER 37.5 MG CAP]; TAKE 1 CAPSULE BY MOUTH EVERY DAY  Dispense: 30 capsule; Refill: 0    If protocol passes may refill for 12 months if within 3 months of last provider visit (or a total of 15 months).             Passed - Blood Pressure in last year     BP Readings from Last 1 Encounters:   04/03/19 98/64

## 2021-05-28 NOTE — TELEPHONE ENCOUNTER
Refill Approved    Rx renewed per Medication Renewal Policy. Medication was last renewed on 4/1/19.    [facility], [facility] Triage/Med Refill 4/29/2019     Requested Prescriptions   Pending Prescriptions Disp Refills     venlafaxine (EFFEXOR-XR) 75 MG 24 hr capsule [Pharmacy Med Name: VENLAFAXINE HCL ER 75 MG CAP] 30 capsule 0     Sig: TAKE 1 CAPSULE BY MOUTH EVERY DAY       Venlafaxine/Desvenlafaxine Refill Protocol Passed - 4/27/2019  7:32 AM        Passed - LFT or AST or ALT in last year     Albumin   Date Value Ref Range Status   10/01/2018 4.2 3.5 - 5.0 g/dL Final     Bilirubin, Total   Date Value Ref Range Status   10/01/2018 2.0 (H) 0.0 - 1.0 mg/dL Final     Alkaline Phosphatase   Date Value Ref Range Status   10/01/2018 32 (L) 45 - 120 U/L Final     AST   Date Value Ref Range Status   10/01/2018 12 0 - 40 U/L Final     ALT   Date Value Ref Range Status   10/01/2018 11 0 - 45 U/L Final     Protein, Total   Date Value Ref Range Status   10/01/2018 7.1 6.0 - 8.0 g/dL Final                Passed - Fasting lipid cascade in last year     Cholesterol   Date Value Ref Range Status   04/04/2019 172 <=199 mg/dL Final     Triglycerides   Date Value Ref Range Status   04/04/2019 50 <=149 mg/dL Final     HDL Cholesterol   Date Value Ref Range Status   04/04/2019 87 >=50 mg/dL Final     LDL Calculated   Date Value Ref Range Status   04/04/2019 75 <=129 mg/dL Final     Patient Fasting > 8hrs?   Date Value Ref Range Status   04/04/2019 Yes  Final   04/04/2019 Yes  Final             Passed - PCP or prescribing provider visit in last year     Last office visit with prescriber/PCP: 4/3/2019 Catarino Earl MD OR same dept: 4/3/2019 Catarino Earl MD OR same specialty: 4/3/2019 Catraino Earl MD  Last physical: Visit date not found Last MTM visit: Visit date not found   Next visit within 3 mo: Visit date not found  Next physical within 3 mo: Visit date not found  Prescriber  OR PCP: Catarino Walker MD  Last diagnosis associated with med order: 1. Anxiety  - venlafaxine (EFFEXOR-XR) 75 MG 24 hr capsule [Pharmacy Med Name: VENLAFAXINE HCL ER 75 MG CAP]; TAKE 1 CAPSULE BY MOUTH EVERY DAY  Dispense: 30 capsule; Refill: 0    If protocol passes may refill for 12 months if within 3 months of last provider visit (or a total of 15 months).             Passed - Blood Pressure in last year     BP Readings from Last 1 Encounters:   04/03/19 98/64

## 2021-05-28 NOTE — TELEPHONE ENCOUNTER
RN cannot approve Refill Request    RN can NOT refill this medication med is not covered by policy/route to provider     . Last office visit: 4/3/2019 Catarino Earl MD Last Physical: Visit date not found Last MTM visit: Visit date not found Last visit same specialty: 4/3/2019 Catarino Earl MD.  Next visit within 3 mo: Visit date not found  Next physical within 3 mo: Visit date not found      [facility], [facility] Triage/Med Refill 5/8/2019    Requested Prescriptions   Pending Prescriptions Disp Refills     nitrofurantoin (MACRODANTIN) 100 MG capsule [Pharmacy Med Name: NITROFUR MAC CAP 100MG] 90 capsule 0     Sig: TAKE 1 CAPSULE FOLLOWING   INTERCOURSE TO PREVENT     URINARY TRACT INFECTION       There is no refill protocol information for this order

## 2021-05-30 VITALS — BODY MASS INDEX: 27.32 KG/M2 | WEIGHT: 170 LBS | HEIGHT: 66 IN

## 2021-05-30 NOTE — PROGRESS NOTES
FOOT AND ANKLE SURGERY/PODIATRY Progress Note        ASSESSMENT:   Pigmented nevus right foot    HPI: Priyanka Madsen was seen again today complaining of a dark spot on the bottom of her right heel.  She noticed it approximately 2 months ago.  She has no pain.  She has not noticed any drainage of bleeding.  She does not recall any trauma to the right heel.  Had no redness or swelling.    No past medical history on file.    Past Surgical History:   Procedure Laterality Date      SECTION       plantar warts         Allergies   Allergen Reactions     Cat Dander Other (See Comments)     Nasal congestion and swelling     Latex Itching     Other Allergy (See Comments) Other (See Comments)     Nasal congestion and swelling         Current Outpatient Medications:      diphenhydrAMINE (BENADRYL) 25 mg capsule, Take 50 mg by mouth as needed for itching., Disp: , Rfl:      hydrOXYzine pamoate (VISTARIL) 25 MG capsule, TAKE 1 TO 4 CAPSULES BY MOUTH AT BEDTIME, Disp: 360 capsule, Rfl: 1     multivitamin with minerals (THERA-M) 9 mg iron-400 mcg Tab tablet, Take 1 tablet by mouth daily., Disp: , Rfl:      nitrofurantoin (MACRODANTIN) 100 MG capsule, TAKE 1 CAPSULE FOLLOWING   INTERCOURSE TO PREVENT     URINARY TRACT INFECTION, Disp: 90 capsule, Rfl: 0     valACYclovir (VALTREX) 1000 MG tablet, Take one tablet daily, Disp: 30 tablet, Rfl: 1     venlafaxine (EFFEXOR-XR) 37.5 MG 24 hr capsule, Take 1 capsule (37.5 mg total) by mouth daily., Disp: 90 capsule, Rfl: 1    Family History   Problem Relation Age of Onset     Thyroid disease Mother      Thyroid disease Sister      Thyroid disease Maternal Aunt      Thyroid disease Maternal Grandmother        Social History     Socioeconomic History     Marital status:      Spouse name: Not on file     Number of children: Not on file     Years of education: Not on file     Highest education level: Not on file   Occupational History     Not on file   Social Needs     Financial  resource strain: Not on file     Food insecurity:     Worry: Not on file     Inability: Not on file     Transportation needs:     Medical: Not on file     Non-medical: Not on file   Tobacco Use     Smoking status: Never Smoker     Smokeless tobacco: Never Used   Substance and Sexual Activity     Alcohol use: Yes     Drug use: Not on file     Sexual activity: Not on file   Lifestyle     Physical activity:     Days per week: Not on file     Minutes per session: Not on file     Stress: Not on file   Relationships     Social connections:     Talks on phone: Not on file     Gets together: Not on file     Attends Sikh service: Not on file     Active member of club or organization: Not on file     Attends meetings of clubs or organizations: Not on file     Relationship status: Not on file     Intimate partner violence:     Fear of current or ex partner: Not on file     Emotionally abused: Not on file     Physically abused: Not on file     Forced sexual activity: Not on file   Other Topics Concern     Not on file   Social History Narrative     Not on file       10 point Review of Systems is negative     There were no vitals filed for this visit.    BMI= There is no height or weight on file to calculate BMI.    OBJECTIVE:  General appearance: Patient is alert and fully cooperative with history & exam.  No sign of distress is noted during the visit.  Vascular: Dorsalis pedis and posterior tibial pulses are palpable. There is pedal hair growth bilaterally.  CFT < 3 sec from anterior tibial surface to distal digits bilaterally. There is no appreciable edema noted.  Dermatologic: Small brown dark flat lesion plantar aspect right heel which measures approximately 1 mm x 1 mm in size.  Turgor and texture are within normal limits. No coloration or temperature changes. No primary or secondary lesions noted.  Neurologic: All epicritic and proprioceptive sensations are grossly intact bilaterally.  Musculoskeletal: All active and  passive ankle, subtalar, midtarsal, and 1st MPJ range of motion are grossly intact without pain or crepitus, with the exception of none. Manual muscle strength is within normal limits bilaterally. All dorsiflexors, plantarflexors, invertors, evertors are intact bilaterally.  No tenderness present to plantar aspect right heel on palpation.  No tenderness to right foot or ankle with range of motion. Calf is soft/non-tender without warmth/induration    Imaging:     No results found.         TREATMENT:  I informed the patient that this is a benign pigmented lesion.  I recommended she continue to monitor this lesion.  If she notices it changing in color size or starts to cause discomfort I recommend she return to the clinic for follow-up visit.         Eleazar Bello; TERE  [facility] Foot & Ankle Surgery/Podiatry

## 2021-05-31 VITALS — BODY MASS INDEX: 24.4 KG/M2 | WEIGHT: 151.2 LBS

## 2021-05-31 VITALS — BODY MASS INDEX: 21.73 KG/M2 | WEIGHT: 130.56 LBS

## 2021-05-31 VITALS — HEIGHT: 65 IN | BODY MASS INDEX: 25.33 KG/M2 | WEIGHT: 152 LBS

## 2021-06-01 VITALS — WEIGHT: 128.1 LBS | BODY MASS INDEX: 21.32 KG/M2

## 2021-06-01 VITALS — WEIGHT: 129.7 LBS | BODY MASS INDEX: 21.58 KG/M2

## 2021-06-02 VITALS — BODY MASS INDEX: 21.54 KG/M2 | WEIGHT: 129.44 LBS

## 2021-06-02 VITALS — WEIGHT: 129 LBS | BODY MASS INDEX: 21.47 KG/M2

## 2021-06-02 VITALS — WEIGHT: 132.06 LBS | BODY MASS INDEX: 21.98 KG/M2

## 2021-06-03 VITALS — WEIGHT: 129 LBS | BODY MASS INDEX: 22.02 KG/M2 | HEIGHT: 64 IN

## 2021-06-03 NOTE — TELEPHONE ENCOUNTER
RN cannot approve Refill Request    RN can NOT refill this medication med is not covered by policy/route to provider.    . Last office visit: Visit date not found Last Physical: Visit date not found Last MTM visit: Visit date not found Last visit same specialty: 4/3/2019 Maribell Walker, Catarino Mitchell MD.  Next visit within 3 mo: Visit date not found  Next physical within 3 mo: Visit date not found      [facility], [facility] Triage/Med Refill 12/1/2019    Requested Prescriptions   Pending Prescriptions Disp Refills     nitrofurantoin (MACRODANTIN) 100 MG capsule [Pharmacy Med Name: NITROFUR MAC CAP 100MG] 90 capsule 0     Sig: TAKE 1 CAPSULE FOLLOWING   INTERCOURSE TO PREVENT     URINARY TRACT INFECTION       There is no refill protocol information for this order

## 2021-06-03 NOTE — TELEPHONE ENCOUNTER
RN cannot approve Refill Request    RN can NOT refill this medication med is not covered by policy/route to provider     . Last office visit: Visit date not found Last Physical: Visit date not found Last MTM visit: Visit date not found Last visit same specialty: 4/3/2019 Catarino Earl MD.  Next visit within 3 mo: Visit date not found  Next physical within 3 mo: Visit date not found      [place] Triage/Med Refill 11/13/2019    Requested Prescriptions   Pending Prescriptions Disp Refills     nitrofurantoin (MACRODANTIN) 100 MG capsule [Pharmacy Med Name: NITROFUR MAC CAP 100MG] 90 capsule 3     Sig: TAKE 1 CAPSULE FOLLOWING   INTERCOURSE TO PREVENT     URINARY TRACT INFECTION       There is no refill protocol information for this order

## 2021-06-04 VITALS
HEART RATE: 97 BPM | BODY MASS INDEX: 23.76 KG/M2 | OXYGEN SATURATION: 99 % | WEIGHT: 138.44 LBS | DIASTOLIC BLOOD PRESSURE: 86 MMHG | SYSTOLIC BLOOD PRESSURE: 122 MMHG

## 2021-06-04 VITALS
BODY MASS INDEX: 22.45 KG/M2 | HEART RATE: 68 BPM | DIASTOLIC BLOOD PRESSURE: 58 MMHG | SYSTOLIC BLOOD PRESSURE: 100 MMHG | HEIGHT: 64 IN | TEMPERATURE: 98.1 F | WEIGHT: 131.5 LBS | OXYGEN SATURATION: 98 %

## 2021-06-05 NOTE — TELEPHONE ENCOUNTER
RN cannot approve Refill Request    RN can NOT refill this medication Protocol failed and NO refill given.       [facility] T[facility]/Med Refill 1/14/2020    Requested Prescriptions   Pending Prescriptions Disp Refills     venlafaxine (EFFEXOR-XR) 37.5 MG 24 hr capsule [Pharmacy Med Name: VENLAFAXINE HCL ER 37.5 MG CAP] 90 capsule 3     Sig: TAKE 1 CAPSULE BY MOUTH EVERY DAY       Venlafaxine/Desvenlafaxine Refill Protocol Failed - 1/11/2020  7:32 PM        Failed - LFT or AST or ALT in last year     Albumin   Date Value Ref Range Status   10/01/2018 4.2 3.5 - 5.0 g/dL Final     Bilirubin, Total   Date Value Ref Range Status   10/01/2018 2.0 (H) 0.0 - 1.0 mg/dL Final     Alkaline Phosphatase   Date Value Ref Range Status   10/01/2018 32 (L) 45 - 120 U/L Final     AST   Date Value Ref Range Status   10/01/2018 12 0 - 40 U/L Final     ALT   Date Value Ref Range Status   10/01/2018 11 0 - 45 U/L Final     Protein, Total   Date Value Ref Range Status   10/01/2018 7.1 6.0 - 8.0 g/dL Final                Passed - Fasting lipid cascade in last year     Cholesterol   Date Value Ref Range Status   04/04/2019 172 <=199 mg/dL Final     Triglycerides   Date Value Ref Range Status   04/04/2019 50 <=149 mg/dL Final     HDL Cholesterol   Date Value Ref Range Status   04/04/2019 87 >=50 mg/dL Final     LDL Calculated   Date Value Ref Range Status   04/04/2019 75 <=129 mg/dL Final     Patient Fasting > 8hrs?   Date Value Ref Range Status   04/04/2019 Yes  Final   04/04/2019 Yes  Final             Passed - PCP or prescribing provider visit in last year     Last office visit with prescriber/PCP: 4/3/2019 Catarino Earl MD OR same dept: 4/3/2019 Catarino Earl MD OR same specialty: 4/3/2019 Catarino Earl MD  Last physical: Visit date not found Last MTM visit: Visit date not found   Next visit within 3 mo: Visit date not found  Next physical within 3 mo: Visit date not  found  Prescriber OR PCP: Catarino Walker MD  Last diagnosis associated with med order: 1. Anxiety  - venlafaxine (EFFEXOR-XR) 37.5 MG 24 hr capsule [Pharmacy Med Name: VENLAFAXINE HCL ER 37.5 MG CAP]; TAKE 1 CAPSULE BY MOUTH EVERY DAY  Dispense: 30 capsule; Refill: 0    If protocol passes may refill for 12 months if within 3 months of last provider visit (or a total of 15 months).             Passed - Blood Pressure in last year     BP Readings from Last 1 Encounters:   07/10/19 115/79

## 2021-06-05 NOTE — PROGRESS NOTES
ASSESSMENT/PLAN:  Sorethroat  -     Rapid Strep A Screen-Throat  -     Group A Strep, RNA Direct Detection, Throat  Negative strep  Await culture  For now, supportive cares  F/u if not better in 2 wks    Pain in joint, ankle and foot, right  Normal exam   Recommend brace, elevation, ice, OTC analgesics, and time  F/u prn    Orders Placed This Encounter   Procedures     Rapid Strep A Screen-Throat     Group A Strep, RNA Direct Detection, Throat     CHIEF COMPLAINT:  Chief Complaint   Patient presents with     Sore Throat     x 4 days     Ankle Pain     right ankle x 1 weeks     Fatigue     HISTORY OF PRESENT ILLNESS:  Priyanka is a 34 y.o. female presenting to the clinic today with a sore throat and right ankle pain.     Sore Throat: She has had a sore throat since last Thursday. Yesterday the sore throat was worse. Today, she is more fatigued than normal. The fatigue is related to the sore throat illness. She has been congested, but denies any cough. She has not had any headaches, rash or stomach pain. She has had some chills, but denies any known fever. Her daughter had pneumonia and her son has had recurrent sinus infections.     Ankle Pain: She has had right ankle pain for 1 week. She believes that she injured it on the treadmill. She has been wearing an ankle brace. She is able to drive with the ankle. It is sore to touch, but does not affect her weight bearing activity.      REVIEW OF SYSTEMS:   Constitutional: Negative.   HENT: Negative.   Eyes: Negative.   Respiratory: Negative.   Cardiovascular: Negative.   Gastrointestinal: Negative.   Endocrine: Negative.   Genitourinary: Negative.   Musculoskeletal: Negative.   Skin: Negative.   Allergic/Immunologic: Negative.   Neurological: Negative.   Hematological: Negative.   Psychiatric/Behavioral: Negative.   All other systems are negative.    TOBACCO USE:  Social History     Tobacco Use   Smoking Status Never Smoker   Smokeless Tobacco Never Used  "    VITALS:  Vitals:    01/27/20 1531   BP: 100/58   Pulse: 68   Temp: 98.1  F (36.7  C)   SpO2: 98%   Weight: 131 lb 8 oz (59.6 kg)   Height: 5' 4\" (1.626 m)     Wt Readings from Last 3 Encounters:   01/27/20 131 lb 8 oz (59.6 kg)   07/10/19 129 lb (58.5 kg)   04/03/19 129 lb 7 oz (58.7 kg)       PHYSICAL EXAM:  Constitutional: Patient is oriented to person, place, and time. Patient appears well-developed and well-nourished. No distress.   Head: Normocephalic and atraumatic.   Right Ear: External ear normal.   Left Ear: External ear normal.   Nose: Nose normal.   Mouth/Throat: Oropharynx is clear and moist. No oropharyngeal exudate. Peritonsillar erythema without any edema or petechiae.    Eyes: Conjunctivae and EOM are normal. Pupils are equal, round, and reactive to light. Right eye exhibits no discharge. Left eye exhibits no discharge. No scleral icterus.   Musculoskeletal: She has good plantar and dorsal flexion of her right ankle. There is no bruising, edema or deformity of the right ankle. She is sore to touch of the right medial malleolus. Her achilles tendon is intact.   Neurological: Patient is alert and oriented to person, place, and time. Patient has normal reflexes. No cranial nerve deficit. Coordination normal.   Skin: Skin is warm and dry. No rash noted. Patient is not diaphoretic. No erythema. No pallor.    Results for orders placed or performed in visit on 01/27/20   Rapid Strep A Screen-Throat   Result Value Ref Range    Rapid Strep A Antigen No Group A Strep detected, presumptive negative No Group A Strep detected, presumptive negative     ADDITIONAL HISTORY SUMMARIZED (2): None.  DECISION TO OBTAIN EXTRA INFORMATION (1): None.   RADIOLOGY TESTS (1): None.  LABS (1): None.  MEDICINE TESTS (1): Performed Rapid Strep test today.   INDEPENDENT REVIEW (2 each): None.     Tia DA SILVA, am scribing for and in the presence of, Dr. Reyna.    Dr. Maribell DA SILVA, personally performed the services described " in this documentation, as scribed by Tia Julien in my presence, and it is both accurate and complete.    MEDICATIONS:  Current Outpatient Medications   Medication Sig Dispense Refill     diphenhydrAMINE (BENADRYL) 25 mg capsule Take 50 mg by mouth as needed for itching.       multivitamin with minerals (THERA-M) 9 mg iron-400 mcg Tab tablet Take 1 tablet by mouth daily.       nitrofurantoin (MACRODANTIN) 100 MG capsule TAKE 1 CAPSULE FOLLOWING   INTERCOURSE TO PREVENT     URINARY TRACT INFECTION 90 capsule 0     valACYclovir (VALTREX) 1000 MG tablet Take one tablet daily 30 tablet 1     venlafaxine (EFFEXOR-XR) 37.5 MG 24 hr capsule TAKE 1 CAPSULE BY MOUTH EVERY DAY 90 capsule 0     No current facility-administered medications for this visit.        Total data points: 1

## 2021-06-06 NOTE — TELEPHONE ENCOUNTER
RN cannot approve Refill Request    RN can NOT refill this medication med is not covered by policy/route to provider. Last office visit: 1/27/2020 Catarino Earl MD Last Physical: Visit date not found Last MTM visit: Visit date not found Last visit same specialty: 1/27/2020 Catarino Earl MD.  Next visit within 3 mo: Visit date not found  Next physical within 3 mo: Visit date not found      [facility] Triage/Med Refill 2/22/2020    Requested Prescriptions   Pending Prescriptions Disp Refills     nitrofurantoin (MACRODANTIN) 100 MG capsule [Pharmacy Med Name: NITROFUR MAC CAP 100MG] 90 capsule 0     Sig: TAKE 1 CAPSULE FOLLOWING   INTERCOURSE TO PREVENT     URINARY TRACT INFECTION       There is no refill protocol information for this order

## 2021-06-07 NOTE — PROGRESS NOTES
Assessment:   1. Hives  Discussed diagnosis with patient and answered her questions. Recommend that patient sleep in a different room from the cats and follow up for allergy testing if symptom persist.   - Thyroid Stimulating Hormone (TSH)  - HM2(CBC w/o Differential)  - Basic Metabolic Panel  - Ambulatory referral to Allergy  - hydrOXYzine pamoate (VISTARIL) 25 MG capsule; Take 1 capsule (25 mg total) by mouth 2 (two) times a day.  Dispense: 60 capsule; Refill: 0     Plan:   Medications: hydroxyzine.  Written patient instruction given.  Follow up in 2 weeks.     Subjective:   Priyanka Madsen is a 34 y.o. female who presents for evaluation of a rash involving the finger, forearm and hand. Rash started 6 weeks ago. Lesions are pink, and flat in texture. Rash gotten better after taking prednisone. Rash also gets better by 2 pm daily and starts again in the morning. Patient has three cats and they sleep with her in her bed. Rash is pruritic. Associated symptoms: none. Patient denies: abdominal pain, arthralgia, cough, decrease in appetite, decrease in energy level, headache, irritability, myalgia, nausea and sore throat. Patient has not had contacts with similar rash. Patient has not had new exposures (soaps, lotions, laundry detergents, foods, medications, plants, insects or animals).    The following portions of the patient's history were reviewed and updated as appropriate: allergies, current medications, past family history, past medical history, past social history, past surgical history and problem list.    Review of Systems  A 12 point comprehensive review of systems was negative except as noted.      Objective:      /86   Pulse 97   Wt 138 lb 7 oz (62.8 kg)   SpO2 99%   BMI 23.76 kg/m    General:  alert, appears stated age and cooperative   Skin:  normal and scattered pink dots on left hand, blanchable

## 2021-06-08 NOTE — TELEPHONE ENCOUNTER
RN cannot approve Refill Request    RN can NOT refill this medication med is not covered by policy/route to provider. Last office visit: 1/27/2020 Maribell Walker, Catarino Mitchell MD Last Physical: Visit date not found Last MTM visit: Visit date not found Last visit same specialty: 5/5/2020 Sydnie Saleh, FNP.  Next visit within 3 mo: Visit date not found  Next physical within 3 mo: Visit date not found      [facility] Triage/Med Refill 5/13/2020    Requested Prescriptions   Pending Prescriptions Disp Refills     nitrofurantoin (MACRODANTIN) 100 MG capsule [Pharmacy Med Name: NITROFUR MAC CAP 100MG] 90 capsule 0     Sig: TAKE 1 CAPSULE FOLLOWING   INTERCOURSE TO PREVENT     URINARY TRACT INFECTION       There is no refill protocol information for this order

## 2021-06-08 NOTE — PROGRESS NOTES
"Priyanka Madsen is a 34 y.o. female who is being evaluated via a billable video visit.      The patient has been notified of following:     \"This video visit will be conducted via a call between you and your physician/provider. We have found that certain health care needs can be provided without the need for an in-person physical exam.  This service lets us provide the care you need with a video conversation.  If a prescription is necessary we can send it directly to your pharmacy.  If lab work is needed we can place an order for that and you can then stop by our lab to have the test done at a later time.    Video visits are billed at different rates depending on your insurance coverage. Please reach out to your insurance provider with any questions.    If during the course of the call the physician/provider feels a video visit is not appropriate, you will not be charged for this service.\"    Patient has given verbal consent to a Video visit? Yes    Patient would like to receive their AVS by AVS Preference: [place].    Patient would like the video invitation sent by: Text to cell phone: [phone]    Will anyone else be joining your video visit? No        Video Start Time: 2:48 PM    Additional provider notes:     Priyanka Madsen 34 y.o.. female with   Chief Complaint   Patient presents with     Test Results     Discuss test results        S:  She's concerned about her random glucose numbers and want to discuss  No h/o DM  Glucose 67 (5/5/20), 87 (4/4/19), 115 (10/1/18)  Ferritin 97  Hemoglobin 12.8, MCV 82  DM in maternal great grandmother  Eats chicken and fish    Was seen recently for hives  Hives are better  Zyrtec and hydroxyzine  extra stress  Cold symptoms few days prior to onset of hives    O:  Constitutional: Patient is oriented to person, place, and time. Patient appears well-developed and well-nourished. No distress.   Head: Normocephalic and atraumatic.   Right Ear: External ear normal.   Left Ear: " External ear normal.   Nose: Nose normal.   Eyes: Conjunctivae and EOM are normal. Right eye exhibits no discharge. Left eye exhibits no discharge. No scleral icterus.   Neurological: Patient is alert and oriented to person, place, and time.   The patient has good eye contact.  No psychomotor retardation or stereotypical behaviors.  Speech was regular rate, regular rhythm, adequate responses.  Mood was stable and affect was congruent mood.  No suicidal or homicidal intent.  No hallucination.      A/P:    Worried well  Random glucose values are within normal range  Reassurance was provided  No further workup        Video-Visit Details    Type of service:  Video Visit    Video End Time (time video stopped): 3:40 PM  Originating Location (pt. Location): Home    Distant Location (provider location):  [facility]     Platform used for Video Visit: [email or web address]      Catarino Walker MD

## 2021-06-08 NOTE — TELEPHONE ENCOUNTER
Refill Request  Medication name:   Requested Prescriptions     Pending Prescriptions Disp Refills     hydrOXYzine pamoate (VISTARIL) 25 MG capsule 60 capsule 0     Sig: Take 1 capsule (25 mg total) by mouth 2 (two) times a day.     Who prescribed the medication: Promise Amajiri  Requested Pharmacy: [facility]  Last appointment with PCP: 5/11/2020  Next appointment: None

## 2021-06-09 NOTE — PROGRESS NOTES
ASSESSMENT/PLAN:  1. Bursitis, right knee  Supportive cares, brace the knee, ice, NSAIDs prn, limit weight bearing activities, and time  Call if still not better in 2 wk    2. Insomnia  Try weaning off hydroxyzine and take just doxepin.  Limit caffeine intake      Patient Instructions   Bursitis    Try to wean off of hydroxyzine.     Try cutting down on the caffeine.     No extra or strenuous weight bearing exercise for 1 week. You can try using a brace to immobilize the right knee. Ice and ibuprofen as needed. Pool therapy is ok.       No orders of the defined types were placed in this encounter.    Medications Discontinued During This Encounter   Medication Reason     doxepin (SINEQUAN) 100 MG capsule Duplicate order     hydrOXYzine (VISTARIL) 25 MG capsule Duplicate order     hydrOXYzine (VISTARIL) 25 MG capsule Duplicate order       No Follow-up on file.    CHIEF COMPLAINT:  Chief Complaint   Patient presents with     Knee Injury     right. Possible injury while running.       HISTORY OF PRESENT ILLNESS:  Priyanka is a 31 y.o. female presenting to the clinic today with concerns of a knee injury.     Knee injury: She enjoys running, and started running again 3 weeks ago. She developed minor pain in her right leg last week, but continued to run anyway. Her pain became worse. She notes tenderness to touch of the right kneecap, and has pain with weight bearing. She denies numbness or weakness of the leg. She has not had injuries or pain in her right knee previously.    Anxiety: She has been taking 1 doxepin and 3 hydroxyzine at night for increased anxiety and insomnia. Of note, she started drinking coffee again. She drinks about 20 ounces of coffee per day. She scores 2 on the PHQ-9 today, noting trouble falling asleep/staying asleep, or sleeping too much and feeling tired or having little energy on several days over the past 2 weeks. Overall, she rates that these symptoms do not make daily activities difficult. She  "scores 0 on the FRANK-7 today.     REVIEW OF SYSTEMS:   She gave birth to twins 4 months ago. She recently started menstruating again.     Constitutional: Negative.   HENT: Negative.   Eyes: Negative.   Respiratory: Negative.   Cardiovascular: Negative.   Gastrointestinal: Negative.   Endocrine: Negative.   Genitourinary: Negative.   Musculoskeletal: Positive for knee pain, as noted above.   Skin: Negative.   Allergic/Immunologic: Negative.   Neurological: Negative.   Hematological: Negative.   Psychiatric/Behavioral: Negative.   All other systems are negative.    PFSH:  She has 4 month old twins.     TOBACCO USE:  History   Smoking Status     Never Smoker   Smokeless Tobacco     Never Used       VITALS:  Vitals:    02/20/17 1526   BP: 100/72   Patient Site: Left Arm   Patient Position: Sitting   Cuff Size: Adult Regular   Pulse: 80   Resp: 10   Weight: 170 lb (77.1 kg)   Height: 5' 6\" (1.676 m)     Wt Readings from Last 3 Encounters:   02/20/17 170 lb (77.1 kg)   07/20/16 195 lb 2 oz (88.5 kg)   05/02/16 157 lb 4.8 oz (71.4 kg)       PHYSICAL EXAM:  Constitutional: Patient is oriented to person, place, and time. Patient appears well-developed and well-nourished. No distress.   Musculoskeletal:No swelling or deformity of the knees, no ecchymoses. No tenderness to palpation of the right calf. Tendneress to palp of medial aspect of right patella, close to bursa. No patella dislocation. Negative anterior drawer sign. Good range of motion of the knee.   Neurological: Patient is alert and oriented to person, place, and time. Patient has normal reflexes. No cranial nerve deficit. Coordination normal.   Skin: Skin is warm and dry. No rash noted. Patient is not diaphoretic. No erythema. No pallor.    ADDITIONAL HISTORY SUMMARIZED (2): None.  DECISION TO OBTAIN EXTRA INFORMATION (1): None.   RADIOLOGY TESTS (1): None.  LABS (1): None.  MEDICINE TESTS (1): None.  INDEPENDENT REVIEW (2 each): None.     The visit lasted a total " of 9 minutes face to face with the patient. Over 50% of the time was spent counseling and educating the patient about knee bursitis.    I, Sintia Acuna, am scribing for and in the presence of, Dr. Reyna.    I, Dr. Reyna, personally performed the services described in this documentation, as scribed by Sintia Acuna in my presence, and it is both accurate and complete.    MEDICATIONS:  Current Outpatient Prescriptions   Medication Sig Dispense Refill     doxepin (SINEQUAN) 100 MG capsule TAKE 1 CAPSULE AT BEDTIME 90 capsule 0     hydrOXYzine (ATARAX) 25 MG tablet Take 3 tablets daily as needed for anxiety 90 tablet 6     valACYclovir (VALTREX) 1000 MG tablet Take one tablet daily 90 tablet 0     No current facility-administered medications for this visit.        Total data points: None

## 2021-06-10 NOTE — TELEPHONE ENCOUNTER
RN cannot approve Refill Request    RN can NOT refill this medication med is not covered by policy/route to provider. Last office visit: 1/27/2020 Maribell Walker, Catarino Mitchell MD Last Physical: Visit date not found Last MTM visit: Visit date not found Last visit same specialty: 5/5/2020 Sydnie Saleh, FNP.  Next visit within 3 mo: Visit date not found  Next physical within 3 mo: Visit date not found      [facility] Triage/Med Refill 8/1/2020    Requested Prescriptions   Pending Prescriptions Disp Refills     nitrofurantoin (MACRODANTIN) 100 MG capsule [Pharmacy Med Name: NITROFUR MAC CAP 100MG] 90 capsule 0     Sig: TAKE 1 CAPSULE FOLLOWING   INTERCOURSE TO PREVENT     URINARY TRACT INFECTION       There is no refill protocol information for this order

## 2021-06-10 NOTE — PROGRESS NOTES
"Priyanka Madsen is a 34 y.o. female who is being evaluated via a billable video visit.      The patient has been notified of following:     \"This video visit will be conducted via a call between you and your physician/provider. We have found that certain health care needs can be provided without the need for an in-person physical exam.  This service lets us provide the care you need with a video conversation.  If a prescription is necessary we can send it directly to your pharmacy.  If lab work is needed we can place an order for that and you can then stop by our lab to have the test done at a later time.    Video visits are billed at different rates depending on your insurance coverage. Please reach out to your insurance provider with any questions.    If during the course of the call the physician/provider feels a video visit is not appropriate, you will not be charged for this service.\"    Patient has given verbal consent to a Video visit? Yes  How would you like to obtain your AVS? AVS Preference: [employer].    Will anyone else be joining your video visit? No        Video Start Time: 3:21 PM    Additional provider notes:   Priyanka Madsen 34 y.o.. female with   Chief Complaint   Patient presents with     body/muscle pain     x 6 months, thinks it is from sitting for too long working front.        S:    Low back pain (right side) from \"sitting all day\" x 6 wks  Worse last week, has been affecting sleep  Trying to exercise  Noted pain is better when she is not sitting prolonged period of time    O:  Constitutional: Patient is oriented to person, place, and time. Patient appears well-developed and well-nourished. No distress.   Head: Normocephalic and atraumatic.   Right Ear: External ear normal.   Left Ear: External ear normal.   Nose: Nose normal.   Eyes: Conjunctivae and EOM are normal. Right eye exhibits no discharge. Left eye exhibits no discharge. No scleral icterus.   Neurological: Patient is alert and " oriented to person, place, and time.   The patient has good eye contact.  No psychomotor retardation or stereotypical behaviors.  Speech was regular rate, regular rhythm, adequate responses.  Mood was stable and affect was congruent mood.  No suicidal or homicidal intent.  No hallucination.      A/P:  Diagnoses and all orders for this visit:    Chronic bilateral low back pain without sciatica  -     Ambulatory referral to Physical Therapy  Advised discussion with work regarding ergonomic work space, chair, wrist support  OTC analgesics, ice, heat/ice,stretching  Advised changing position (getting up & stretching for every hour of sitting)    Video-Visit Details    Type of service:  Video Visit    Video End Time (time video stopped): 3:35 PM  Originating Location (pt. Location): Home    Distant Location (provider location):  [facility]     Platform used for Video Visit: Tyler Walker MD

## 2021-06-10 NOTE — TELEPHONE ENCOUNTER
RN cannot approve Refill Request    RN can NOT refill this medication med is not covered by policy/route to provider. Last office visit: 1/27/2020 Catarino Earl MD Last Physical: Visit date not found Last MTM visit: Visit date not found Last visit same specialty: 5/5/2020 Sydnie Saleh FNP.  Next visit within 3 mo: Visit date not found  Next physical within 3 mo: Visit date not found      Kassy Laguerre, [facility] Triage/Med Refill 7/30/2020    Requested Prescriptions   Pending Prescriptions Disp Refills     hydrOXYzine pamoate (VISTARIL) 25 MG capsule [Pharmacy Med Name: HYDROXYZ LINDA CAP 25MG] 60 capsule 0     Sig: TAKE 1 CAPSULE TWICE DAILY       Antihistamine Refill Protocol Passed - 7/28/2020  9:44 AM        Passed - Patient has had office visit/physical in last year     Last office visit with prescriber/PCP: 1/27/2020 Catarino Earl MD OR same dept: 5/5/2020 Sydnie Saleh FNP OR same specialty: 5/5/2020 Sydnie Saleh FNP  Last physical: Visit date not found Last MTM visit: Visit date not found   Next visit within 3 mo: Visit date not found  Next physical within 3 mo: Visit date not found  Prescriber OR PCP: Catarino Walker MD  Last diagnosis associated with med order: 1. Hives  - hydrOXYzine pamoate (VISTARIL) 25 MG capsule [Pharmacy Med Name: HYDROXYZ LINDA CAP 25MG]; TAKE 1 CAPSULE TWICE DAILY  Dispense: 60 capsule; Refill: 0    If protocol passes may refill for 12 months if within 3 months of last provider visit (or a total of 15 months).

## 2021-06-10 NOTE — TELEPHONE ENCOUNTER
Video appointment made for 8/10 at 3:20 pm with Dr. Reyna.    Vibha CROWELL CMA ([facility])

## 2021-06-10 NOTE — TELEPHONE ENCOUNTER
Refill Approved    Rx renewed per Medication Renewal Policy. Medication was last renewed on 7/30/20, last OV 5/11/20.    Rosemarie Elmore, [facility] Triage/Med Refill 8/1/2020     Requested Prescriptions   Pending Prescriptions Disp Refills     hydrOXYzine pamoate (VISTARIL) 25 MG capsule 60 capsule 0     Sig: Take 1 capsule (25 mg total) by mouth 2 (two) times a day.       Antihistamine Refill Protocol Passed - 7/31/2020 10:22 AM        Passed - Patient has had office visit/physical in last year     Last office visit with prescriber/PCP: 1/27/2020 Catarino Earl MD OR same dept: 5/5/2020 Sydnie Saleh FNP OR same specialty: 5/5/2020 Sydnie Saleh FNP  Last physical: Visit date not found Last MTM visit: Visit date not found   Next visit within 3 mo: Visit date not found  Next physical within 3 mo: Visit date not found  Prescriber OR PCP: Catarino Walker MD  Last diagnosis associated with med order: 1. Hives  - hydrOXYzine pamoate (VISTARIL) 25 MG capsule; Take 1 capsule (25 mg total) by mouth 2 (two) times a day.  Dispense: 60 capsule; Refill: 0    If protocol passes may refill for 12 months if within 3 months of last provider visit (or a total of 15 months).

## 2021-06-11 NOTE — PROGRESS NOTES
31-year-old female with depression anxiety comes in today for worsening anxiety as her 8-month-old baby boy is going for surgery for hypospadia.  She is already taking hydroxyzine and doxepin for insomnia.  Her preference is to have a short-term anxiolytic medication for her son's perioperative period.  I have given her Lorazepam 0.5 mg for her to take once or twice daily.  The expectation is that she will use this only for this time.  This would not be a chronic medication.  We spoke in length about side effects, safety profile, potential for abuse/dependency/intolerance.  Patient verbalized understanding and agreed with the plan    ASSESSMENT/PLAN:  1. Anxiety  - LORazepam (ATIVAN) 0.5 MG tablet; Take one tablet twice daily as needed  Dispense: 30 tablet; Refill: 0    2. Environmental allergies  - Ambulatory referral to Allergy    Orders Placed This Encounter   Procedures     Ambulatory referral to Allergy     Referral Priority:   Routine     Referral Type:   Allergy, Asthma, and Immunology     Referral Reason:   Evaluation and Treatment     Requested Specialty:   Allergy     Number of Visits Requested:   1           CHIEF COMPLAINT:  Chief Complaint   Patient presents with     Follow-up     Med check      Anxiety     Anxiety       HISTORY OF PRESENT ILLNESS:  Priyanka is a 31 y.o. female presenting to the clinic today for a follow up for anxiety. She sees a therapist once every couple of months. She has been having increasing anxiety about her baby son's upcoming surgery. She has talked through this with her therapist, and her therapist has suggested maybe Xanax would help with her anxiety on the day of surgery. When she starts thinking about her son's surgery, she starts to have small panic attacks. Her son will be 9 months by the time he has surgery. She has been taking doxepin and hydroxyzine at night. She will typically take 50mg of hydroxyzine at night, and will take up to 100mg if she cannot fall asleep. She  has not taken it during the day in a long time.     Little interest or pleasure in doing things: Not at all  Feeling down, depressed, or hopeless: Not at all  Trouble falling or staying asleep, or sleeping too much: Not at all  Feeling tired or having little energy: Not at all  Poor appetite or overeating: Not at all  Feeling bad about yourself - or that you are a failure or have let yourself or your family down: Not at all  Trouble concentrating on things, such as reading the newspaper or watching television: Not at all  Moving or speaking so slowly that other people could have noticed. Or the opposite - being so fidgety or restless that you have been moving around a lot more than usual: Not at all  Thoughts that you would be better off dead, or of hurting yourself in some way: Not at all  PHQ-9 Total Score: 0    Feeling nervous, anxious or on edge: 1  Not being able to stop or control worry: 0  Worrying too much about different things: 0  Trouble relaxin  Being so restless that is is hard to sit still: 0  Becoming easily annnoyed or irritable: 1  Feeling afraid as if something awful might happen: 1  FRANK-7 Total: 3  How difficult did these problems make it for you to do your work, take care of things at home or get along with other people? : Not difficult at all     Allergies: She feels like she has seasonal allergies. She is usually has her allergy symptoms during the summer. Her symptoms are mostly nasal symptoms such as congestion or rhinorrhea. She does know that she has allergy to cats. She has been taking non-drowsy Benadryl for her allergies. She feels like having formal allergy testing would be helpful.     REVIEW OF SYSTEMS:   Constitutional: Negative.   HENT: Negative.   Eyes: Negative.   Respiratory: Negative.   Cardiovascular: Negative.   Gastrointestinal: Negative.   Endocrine: Negative.   Genitourinary: Negative.   Musculoskeletal: Negative.   Skin: Negative.   Allergic/Immunologic: Negative.    Neurological: Negative.   Hematological: Negative.   Psychiatric/Behavioral: Negative.   All other systems are negative.    PFSH:  No new history.     TOBACCO USE:  History   Smoking Status     Never Smoker   Smokeless Tobacco     Never Used       VITALS:  Vitals:    06/28/17 0747   BP: 98/62   Patient Site: Left Arm   Patient Position: Sitting   Cuff Size: Adult Regular   Pulse: 78   Weight: 151 lb 3.2 oz (68.6 kg)     Wt Readings from Last 3 Encounters:   06/28/17 151 lb 3.2 oz (68.6 kg)   02/20/17 170 lb (77.1 kg)   07/20/16 195 lb 2 oz (88.5 kg)       PHYSICAL EXAM:  Constitutional: Patient is oriented to person, place, and time. Patient appears well-developed and well-nourished. No distress.   Head: Normocephalic and atraumatic.   Right Ear: External ear normal.   Left Ear: External ear normal.   Nose: Nose normal.   Mouth/Throat: Oropharynx is clear and moist. No oropharyngeal exudate.   Eyes: Conjunctivae and EOM are normal. Right eye exhibits no discharge. Left eye exhibits no discharge. No scleral icterus.   Neurological: Patient is alert and oriented to person, place, and time. No cranial nerve deficit. Coordination normal.   Skin: Skin is warm and dry. No rash noted. Patient is not diaphoretic. No erythema. No pallor.  The patient has good eye contact.  No psychomotor retardation or stereotypical behaviors.  Speech is regular rate, regular rhythm, adequate responses.  Mood is stable and affect is congruent mood.  No suicidal or homicidal intent.  No hallucination.    Results for orders placed or performed in visit on 08/24/15   Thyroid Stimulating Hormone (TSH)   Result Value Ref Range    TSH 2.27 0.30 - 5.05 uIU/mL   T4, Free   Result Value Ref Range    Free T4 0.9 0.7 - 1.8 ng/dL           ADDITIONAL HISTORY SUMMARIZED (2): None.  DECISION TO OBTAIN EXTRA INFORMATION (1): None.   RADIOLOGY TESTS (1): None.  LABS (1): None.  MEDICINE TESTS (1): None.  INDEPENDENT REVIEW (2 each): None.     Yazmin DA SILVA  Jaja Shen, am scribing for and in the presence of, Dr. Reyna.    I, Catarino Walker MD, personally performed the services described in this documentation, as scribed by Yazmin Shen in my presence, and it is both accurate and complete.    MEDICATIONS:  Current Outpatient Prescriptions   Medication Sig Dispense Refill     doxepin (SINEQUAN) 100 MG capsule TAKE 1 CAPSULE AT BEDTIME 90 capsule 0     hydrOXYzine (VISTARIL) 25 MG capsule TAKE 1 TO 4 CAPSULES AT    BEDTIME 120 capsule 3     multivitamin with minerals (THERA-M) 9 mg iron-400 mcg Tab tablet Take 1 tablet by mouth daily.       valACYclovir (VALTREX) 1000 MG tablet Take one tablet daily 90 tablet 0     LORazepam (ATIVAN) 0.5 MG tablet Take one tablet twice daily as needed 30 tablet 0     No current facility-administered medications for this visit.        Total data points: 0

## 2021-06-11 NOTE — PROGRESS NOTES
"Chief complaint: Allergy testing    History of present illness: This is a pleasant 31-year-old woman who is here today for allergy testing.  She states she has had allergies to her entire life.  Symptoms consist of itchy, watery eyes, congestion, runny nose and drainage.  She takes Benadryl only as needed.  She does not use nasal sprays or rinses.  She states she met her deductible and would like to know that she is allergic to in order to better protect herself.  She does have 3 cats at home and notes that they do seem to bother her symptoms.  She states that the cat dander is built up in her home, she will have increased itchy eyes and sneezing.  Since smell is decreased.  No history of asthma.  No cough, wheeze or shortness of breath.    Past medical history: Tonsillectomy, anxiety    Social history: She works at [employer], has 3 cats, non-smoker, round her windows at home she believes there is some mold    Family history: Mom with possible allergies    Review of Systems performed as above and the remainder is negative.      Current Outpatient Prescriptions:      doxepin (SINEQUAN) 100 MG capsule, TAKE 1 CAPSULE AT BEDTIME, Disp: 90 capsule, Rfl: 0     hydrOXYzine (VISTARIL) 25 MG capsule, TAKE 1 TO 4 CAPSULES AT    BEDTIME, Disp: 120 capsule, Rfl: 3     LORazepam (ATIVAN) 0.5 MG tablet, Take one tablet twice daily as needed, Disp: 30 tablet, Rfl: 0     multivitamin with minerals (THERA-M) 9 mg iron-400 mcg Tab tablet, Take 1 tablet by mouth daily., Disp: , Rfl:      valACYclovir (VALTREX) 1000 MG tablet, Take one tablet daily, Disp: 90 tablet, Rfl: 0     diphenhydrAMINE (BENADRYL) 25 mg capsule, Take 50 mg by mouth as needed for itching., Disp: , Rfl:     No Known Allergies    /62  Pulse 72  Ht 5' 5\" (1.651 m)  Wt 152 lb (68.9 kg)  LMP 06/05/2017 (Exact Date)  BMI 25.29 kg/m2  Gen: Pleasant female not in acute distress  HEENT: Eyes no erythema of the bulbar or palpebral conjunctiva, no edema. Ears: TMs well " visualized, no effusions. Nose: Inferior turbinates are swollen, pale, blue. Mouth: Throat clear, no lip or tongue edema.   Cardiac: Regular rate and rhythm, no murmurs, rubs or gallops  Respiratory: Clear to auscultation bilaterally, no adventitious breath sounds  Lymph: No supraclavicular or cervical lymphadenopathy  Skin: No rashes or lesions  Psych: Alert and oriented times 3    Last Percutaneous Allergy Test Results  Trees  [place], White  1:20 H  (W/F in mm): 0-0 (07/14/17 0819)  Birch Mix 1:20 H (W/F in mm): 3-f (07/14/17 0819)  [place], Common 1:20 H (W/F in mm): 0-0 (07/14/17 0819)  Elm, American 1:20 H (W/F in mm): 0-0 (07/14/17 0819)  [place], Shagbark 1:20 H (W/F in mm): 0-0 (07/14/17 0819)  Maple, Hard/Sugar 1:20 H (W/F in mm): 3-f (07/14/17 0819)  [place] Mix 1:20 H (W/F in mm): 0-0 (07/14/17 0819)  Oak, Red 1:20 H (W/F in mm): 5-f (07/14/17 0819)  [place], American 1:20 H (W/F in mm): 0-0 (07/14/17 0819)  [place] Tree 1:20 H (W/F in mm): 0-0 (07/14/17 0819)  Dust Mites  D. Pteronyssinus Mite 30,000 AU/ML H (W/F in mm): 11-20 (07/14/17 0819)  D. Farinae Mite 30,000 AU/ML H (W/F in mm: 9-15 (07/14/17 0819)  Grasses  Grass Mix #4 10,000 BAU/ML H: 4-10 (07/14/17 0819)  [place] Grass 1:20 H (W/F in mm): 3-f (07/14/17 0819)  Cockroach  Cockroach Mix 1:10 H (W/F in mm): 0-0 (07/14/17 0819)  Molds/Fungi  Alternaria Tenuis 1:10 H (W/F in mm): 0-0 (07/14/17 0819)  Aspergillus Fumigatus 1:10 H (W/F in mm): 0-0 (07/14/17 0819)  Homodendrum Cladosporioides 1:10 H (W/F in mm): 0-0 (07/14/17 0819)  Penicillin Notatum 1:10 H (W/F in mm): 0-0 (07/14/17 0819)  Epicoccum 1:10 H (W/F in mm): 0-0 (07/14/17 0819)  Weeds  Ragweed, Short 1:20 H (W/F in mm): 3-f (07/14/17 0819)  Dock, Sorrel 1:20 H (W/F in mm): 3-f (07/14/17 0819)  Lamb's Quarter 1:20 H (W/F in mm): 0-0 (07/14/17 0819)  Pigweed, Rough Red Root 1:20 H  (W/F in mm): 0-0 (07/14/17 0819)  Plantain, English 1:20 H  (W/F in mm): 0-0 (07/14/17 0819)  Sagebrush,  Mugwort 1:20 H  (W/F in mm): 0-0 (07/14/17 0819)  Animal  Cat 10,000 BAU/ML H (W/F in mm): 3-f (07/14/17 0819)  Dog 1:10 H (W/F in mm): 0-0 (07/14/17 0819)  Controls  Device Type: QUINTIP (07/14/17 0819)  Neg. control: 50% Glycerine/Saline H (W/F in mm): 0-0 (07/14/17 0819)  Pos. control: Histamine 6mg/ML (W/F in mms): 3-5 (07/14/17 0819)        Impression report and plan:    1.  Allergic rhinitis    I went over environmental control.  I recommended daily antihistamine of Claritin.  Sinus rinses should be considered.  I would also recommend fluticasone nasal spray 1 spray each nostril twice daily.  Vaseline to the nose for dryness.  Follow as needed.

## 2021-06-11 NOTE — PROGRESS NOTES
Subjective findings: The patient returns to the clinic today for follow-up evaluation of a recurrent wart on the bottom of the right foot.  The patient stated that she feels that the wart might be recurring.  She stated that it may be a new wart.  She has not had any discomfort.      Objective findings: Skin bilateral feet warm and intact.  There is no evidence of any hyperkeratotic lesion on the plantar aspect of the right foot.  No evidence of any recurring abnormal tissue.  No pain on palpation of the treated area.  No edema or erythema noted.  Neurovascular status is intact.     Assessment: Verruca plantaris     Plan: I informed the patient that it appears to wart has been successfully treated.  There is no evidence of any new warts. She is to monitor her condition and of the wart recurs I have asked the patient will return to the clinic for continued follow-up care.

## 2021-06-12 NOTE — TELEPHONE ENCOUNTER
RN cannot approve Refill Request    RN can NOT refill this medication med is not covered by policy/route to provider. Last office visit: 1/27/2020 Maribell Walker, Catarino Mitchell MD Last Physical: Visit date not found Last MTM visit: Visit date not found Last visit same specialty: 5/5/2020 Sydnie Saleh, FNP.  Next visit within 3 mo: Visit date not found  Next physical within 3 mo: Visit date not found      Elizabeth Collado, [facility] Triage/Med Refill 11/4/2020    Requested Prescriptions   Pending Prescriptions Disp Refills     nitrofurantoin (MACRODANTIN) 100 MG capsule [Pharmacy Med Name: NITROFUR MAC CAP 100MG] 90 capsule 0     Sig: TAKE 1 CAPSULE FOLLOWING   INTERCOURSE TO PREVENT     URINARY TRACT INFECTION       There is no refill protocol information for this order

## 2021-06-14 NOTE — PROGRESS NOTES
"Priyanka Madsen is a 35 y.o. female who is being evaluated via a billable video visit.      How would you like to obtain your AVS? MyChart.    Will anyone else be joining your video visit? No      Video Start Time: 4:18 PM  Assessment & Plan     Priyanka was seen today for med check and medication refill.    Diagnoses and all orders for this visit:    Mild recurrent major depression (H), Anxiety  -     venlafaxine (EFFEXOR-XR) 37.5 MG 24 hr capsule; TAKE 1 CAPSULE BY MOUTH EVERY DAY  Refilled  Monitor diaphoresis side effect  F/u yearly    Recurrent cold sores  -     valACYclovir (VALTREX) 1000 MG tablet; Take 2 tablets at the onset of cold sores followed by 2 tablets 12 hours later.  Repeat when needed  Refilled  F/u yearly    Recurrent UTI  -     nitrofurantoin (MACRODANTIN) 100 MG capsule; Take 1 capsule following intercourse  Refilled  F/u yearly    Hives, Primary insomnia  Increased dose to three times a day prn  F/u yearly  -     hydrOXYzine pamoate (VISTARIL) 25 MG capsule; Take 1 capsule (25 mg total) by mouth 3 (three) times a day as needed for itching.      Catarino Walker MD  [facility]     Priyanka Madsen is 35 y.o. and presents to clinic today for the following health issues   HPI     Hydroxyzine two 25mg capsule at bedtime to help with sleep  Sometimes she takes an additional capsule to help her fall back asleep    effexor 37.5mg for depression and anxiety  Working well for depression and anxiety. \"no butterfly feeling in the chest\"  has been causing night sweats  Worse at night  Tolerable  Little interest or pleasure in doing things: Not at all  Feeling down, depressed, or hopeless: Not at all  Trouble falling or staying asleep, or sleeping too much: Not at all  Feeling tired or having little energy: Not at all  Poor appetite or overeating: Not at all  Feeling bad about yourself - or that you are a failure or have let yourself or your family " down: Not at all  Trouble concentrating on things, such as reading the newspaper or watching television: Not at all  Moving or speaking so slowly that other people could have noticed. Or the opposite - being so fidgety or restless that you have been moving around a lot more than usual: Not at all  Thoughts that you would be better off dead, or of hurting yourself in some way: Not at all  PHQ-9 Total Score: 0  If you checked off any problems, how difficult have these problems made it for you to do your work, take care of things at home, or get along with other people?: Not difficult at all    FRANK-7 Screening Results:  Feeling nervous, anxious or on edge: 0 (2021  4:00 PM)  Not being able to stop or control worry: 0 (2021  4:00 PM)  Worrying too much about different things: 1 (2021  4:00 PM)  Trouble relaxin (2021  4:00 PM)  Being so restless that is is hard to sit still: 0 (2021  4:00 PM)  Becoming easily annnoyed or irritable: 1 (2021  4:00 PM)  Feeling afraid as if something awful might happen: 0 (2021  4:00 PM)  FRANK-7 Total: 3 (2021  4:00 PM)  How difficult did these problems make it for you to do your work, take care of things at home or get along with other people? : Not difficult at all (2021  4:00 PM)  How difficult did these problems make it for you to do your work, take care of things at home or get along with other people? : Not difficult at all (2021  4:00 PM)        Cold sores due to stress  Twice monthly  Valtrex has helped    Nitrofurantoin for postcoital prophylaxis  Stable      Objective       Vitals:  No vitals were obtained today due to virtual visit.    Physical Exam  Constitutional: Patient is oriented to person, place, and time. Patient appears well-developed and well-nourished. No distress.   Head: Normocephalic and atraumatic.   Right Ear: External ear normal.   Left Ear: External ear normal.   Nose: Nose normal.   Eyes: Conjunctivae and EOM  are normal. Right eye exhibits no discharge. Left eye exhibits no discharge. No scleral icterus.   Neurological: Patient is alert and oriented to person, place, and time. No cranial nerve deficit. Coordination normal.   Skin: No rash noted. Patient is not diaphoretic. No erythema. No pallor.           Video-Visit Details    Type of service:  Video Visit    Video End Time (time video stopped): 4:32 PM  Originating Location (pt. Location): Home    Distant Location (provider location):  [facility]     Platform used for Video Visit: Traffio

## 2021-06-15 NOTE — TELEPHONE ENCOUNTER
RN cannot approve Refill Request    RN can NOT refill this medication med is not covered by policy/route to provider. Last office visit: 1/27/2020 Maribell Walker, Catarino Mitchell MD Last Physical: Visit date not found Last MTM visit: Visit date not found Last visit same specialty: 5/5/2020 Sydnie Saleh, FNP.  Next visit within 3 mo: Visit date not found  Next physical within 3 mo: Visit date not found      Geneva Sawyer, [facility] Triage/Med Refill 2/12/2021    Requested Prescriptions   Pending Prescriptions Disp Refills     nitrofurantoin (MACRODANTIN) 100 MG capsule [Pharmacy Med Name: NITROFUR MAC CAP 100MG] 90 capsule 0     Sig: TAKE 1 CAPSULE FOLLOWING   INTERCOURSE TO PREVENT     URINARY TRACT INFECTION       There is no refill protocol information for this order

## 2021-06-15 NOTE — PROGRESS NOTES
ASSESSMENT/PLAN:  Pleasant 32 y.o.  female presents with influenza.  Will give her tamiflu.  The patient may continue with OTC symptomatic treatment.  Rest, hydration, nutritional support, and time were counseled.  Follow up if the patient is not better in 1-2 weeks.  The patient verbalized understanding and agreed with the plan.    1. Sorethroat  - Rapid Strep A Screen-Throat  - Influenza A/B Rapid Test  - Group A Strep, RNA Direct Detection, Throat    2. Influenza  - oseltamivir (TAMIFLU) 75 MG capsule; Take 1 capsule (75 mg total) by mouth 2 (two) times a day for 5 days.  Dispense: 10 capsule; Refill: 0    3. Back pain  She will call her insurance about chiropractic facility.  She'll call me if she needs a referral      Orders Placed This Encounter   Procedures     Rapid Strep A Screen-Throat     Influenza A/B Rapid Test     Group A Strep, RNA Direct Detection, Throat           CHIEF COMPLAINT:  Chief Complaint   Patient presents with     Sore Throat     x 1 day     Fatigue     bodyache       HISTORY OF PRESENT ILLNESS:  Priyanka is a 32 y.o. female presenting to the clinic today for flu. She reports feeling a sore throat for the past day. Her children have gotten sick in the past week: one has strep, one has the flu, and another has an ear infection. She has been experiencing a sore throat, continuous runny nose, achy muscles, and no fever. She has not been sleeping well lately. She has used Nyquil and Ibuprofen to relieve her symptoms.     Back/Hip Pain: She has experienced hip and back pain throughout the years. She would like a referral to a chiropractor.    Anxiety: She continues on doxepin and hydroxyzine as needed. She has been trying to cut down on her medication. She feels that her anxiety is well controlled.    REVIEW OF SYSTEMS:   She does not eat a very diverse diet. She does not consume beef or milk. She notes having been anemic in the past.    Constitutional: Negative.   HENT: Negative.   Eyes:  Negative.   Respiratory: Negative.   Cardiovascular: Negative.   Gastrointestinal: Negative.   Endocrine: Negative.   Genitourinary: Negative.   Musculoskeletal: Negative.   Skin: Negative.   Allergic/Immunologic: Negative.   Neurological: Negative.   Hematological: Negative.   Psychiatric/Behavioral: Negative.   All other systems are negative.    PFSH:  Reviewed as below.    TOBACCO USE:  History   Smoking Status     Never Smoker   Smokeless Tobacco     Never Used       VITALS:  Vitals:    01/16/18 1250   BP: 104/78   Pulse: 80   Temp: 98.5  F (36.9  C)   TempSrc: Oral   SpO2: 100%   Weight: 130 lb 9 oz (59.2 kg)     Wt Readings from Last 3 Encounters:   01/16/18 130 lb 9 oz (59.2 kg)   07/14/17 152 lb (68.9 kg)   06/28/17 151 lb 3.2 oz (68.6 kg)       PHYSICAL EXAM:  Constitutional: Patient is oriented to person, place, and time. Patient appears well-developed and well-nourished. No distress.   HEENT:  NC/AT, EOMI, CHANEL  Normal ears  ctab  Rrr, no murmur  No rash  Neurological: Patient is alert and oriented to person, place, and time. Patient has normal reflexes. No cranial nerve deficit. Coordination normal.     ADDITIONAL HISTORY SUMMARIZED (2): None.  DECISION TO OBTAIN EXTRA INFORMATION (1): None.   RADIOLOGY TESTS (1): None.  LABS (1): Labs were ordered today. Reviewed today's labs; positive influenza.  MEDICINE TESTS (1): None.  INDEPENDENT REVIEW (2 each): None.     ILubna, am scribing for and in the presence of, Dr. Reyna.    I, Dr. Reyna, personally performed the services described in this documentation, as scribed by Lubna Elliott in my presence, and it is both accurate and complete.    MEDICATIONS:  Current Outpatient Prescriptions   Medication Sig Dispense Refill     diphenhydrAMINE (BENADRYL) 25 mg capsule Take 50 mg by mouth as needed for itching.       doxepin (SINEQUAN) 100 MG capsule TAKE 1 CAPSULE AT BEDTIME 90 capsule 0     doxepin (SINEQUAN) 100 MG capsule TAKE 1 CAPSULE AT BEDTIME 90 capsule  1     fluticasone (FLONASE) 50 mcg/actuation nasal spray 1 spray each nostril twice daily 1 g 3     hydrOXYzine (VISTARIL) 25 MG capsule TAKE 1 TO 4 CAPSULES AT    BEDTIME 120 capsule 3     multivitamin with minerals (THERA-M) 9 mg iron-400 mcg Tab tablet Take 1 tablet by mouth daily.       oseltamivir (TAMIFLU) 75 MG capsule Take 1 capsule (75 mg total) by mouth 2 (two) times a day for 5 days. 10 capsule 0     valACYclovir (VALTREX) 1000 MG tablet Take one tablet daily 90 tablet 0     No current facility-administered medications for this visit.        Total data points:1

## 2021-06-15 NOTE — TELEPHONE ENCOUNTER
Patient's medication filled at [facility] two weeks ago. Should already have a enough on file.   Zaria Thompson LPN

## 2021-06-15 NOTE — PROGRESS NOTES
Priyanka Madsen is a 35 y.o. female who is being evaluated via a billable video visit.      How would you like to obtain your AVS? MyChart.  If dropped from the video visit, the video invitation should be resent by: Text to cell phone: [phone]  Will anyone else be joining your video visit? No      Video Start Time: 11:01 AM    Assessment & Plan     Priyanka was seen today for arm pain.    Diagnoses and all orders for this visit:    Adverse effect of vaccine, initial encounter  Redness along the injection site  Monitor for now  May apply ice/heat and take OTC analgesics prn  F/u if with worsening symptoms    Catarino Walker MD  [facility]    Subjective   Priyanka Madsen is 35 y.o. and presents today for the following health issues   HPI     Received her 2nd COVID19 dose on 2/27/21  Today with redness & swelling of the left shoulder  Pain along armpit and neck on the left side  No fever        Objective    Vitals - Patient Reported  Weight (Patient Reported): 134 lb (60.8 kg)    Physical Exam  Constitutional: Patient is oriented to person, place, and time. Patient appears well-developed and well-nourished. No distress.   Head: Normocephalic and atraumatic.   Right Ear: External ear normal.   Left Ear: External ear normal.   Eyes: Conjunctivae and EOM are normal. Right eye exhibits no discharge. Left eye exhibits no discharge. No scleral icterus.   Neurological: Patient is alert and oriented to person, place, and time.  Coordination normal.   Left shoulder:  Red patch along the injection site          Video-Visit Details    Type of service:  Video Visit    Video End Time (time video stopped): 11:17 AM  Originating Location (pt. Location): Home    Distant Location (provider location):  [facility]     Platform used for Video Visit: [employer]

## 2021-06-17 NOTE — PROGRESS NOTES
Assessment/Plan:        Diagnoses and all orders for this visit:    Insomnia  I will refill already prescribed medication.  -     hydrOXYzine pamoate (VISTARIL) 25 MG capsule; TAKE 1 TO 4 CAPSULES AT    BEDTIME  Dispense: 60 capsule; Refill: 0      Nasal Congestion  Sinus Pressure    Vital signs stable.   I do suspect viral, she has had runny nose, sneezing, now sinus pressure on day 5  I did recommend home supportive care.   Discussed home supportive care and recommended rest, fluids/ warm fluids, humidification, saline nasal spray, throat lozenges, gargle with warm salt water.    Follow up if worsening symptoms, questions or concerns. Informed if sinus pain, purulent nasal drainage follow up for further evaluation.   Discussed red flags that would warrant urgent evaluation. Patient verbalized understanding.  Patient agreed and appeared pleased with plan      Emeli Yoder, [facility]          Subjective:    Patient ID: Priyanka Madsen is a 32 y.o. female.    HPI Comments: 33 y/o female presents today for head cold.   She notes her symptoms started 4/22/2018 with runny nose, sneezing. Then 4/23/2018 fever, chils, bodyaches  She state symptoms are overall improved but today she presents with nasal congestion, coughing, sinus pressure.   She has been taking Sudafed and feels this is improving her symtpoms  Ears popping occasionally, denies pain.   LMP was 4/22/2018  Denies wheezing shortness of breath or chest pain. Denies abdominal pain n/v/d/  Nonsmoker  Denies hx asthma  SIck contact family, children - three children all with similar symptoms.   History of sinus infections in the past and not worsening.   Denies allergies to abx. Overall she feels that her symptoms are improving.   Denies fever, chills today    Sinus Problem   Associated symptoms include sinus pressure. Pertinent negatives include no chills or sore throat.       The following portions of the patient's history were reviewed and  updated as appropriate: allergies, current medications, past family history, past medical history, past social history, past surgical history and problem list.    Review of Systems   Constitutional: Negative for chills and fever.   HENT: Positive for rhinorrhea and sinus pressure. Negative for postnasal drip, sinus pain and sore throat.    Eyes: Negative.    Respiratory: Negative.    Cardiovascular: Negative.    Gastrointestinal: Negative.    Psychiatric/Behavioral: Negative.              Objective:    Physical Exam   Constitutional: She is oriented to person, place, and time. She appears well-developed and well-nourished. No distress.   HENT:   Right Ear: External ear normal.   Left Ear: External ear normal.   Audible congestion   Eyes: Conjunctivae are normal. Right eye exhibits no discharge. Left eye exhibits no discharge.   Cardiovascular: Normal rate and normal heart sounds.    No murmur heard.  Pulmonary/Chest: Effort normal and breath sounds normal. No respiratory distress. She has no wheezes. She has no rales. She exhibits no tenderness.   Lymphadenopathy:     She has no cervical adenopathy.   Neurological: She is alert and oriented to person, place, and time.   Skin: She is not diaphoretic.   Psychiatric: She has a normal mood and affect. Her behavior is normal. Judgment normal.               Vitals:    04/27/18 1425   BP: 104/64   Patient Site: Left Arm   Patient Position: Sitting   Cuff Size: Adult Regular   Pulse: 73   Temp: 97.9  F (36.6  C)   TempSrc: Oral   SpO2: 98%   Weight: 129 lb 11.2 oz (58.8 kg)       Current Outpatient Prescriptions   Medication Sig Dispense Refill     diphenhydrAMINE (BENADRYL) 25 mg capsule Take 50 mg by mouth as needed for itching.       hydrOXYzine (VISTARIL) 25 MG capsule TAKE 1 TO 4 CAPSULES AT    BEDTIME 120 capsule 3     multivitamin with minerals (THERA-M) 9 mg iron-400 mcg Tab tablet Take 1 tablet by mouth daily.       valACYclovir (VALTREX) 1000 MG tablet Take one  tablet daily 30 tablet 1     doxepin (SINEQUAN) 100 MG capsule TAKE 1 CAPSULE AT BEDTIME 90 capsule 0     doxepin (SINEQUAN) 100 MG capsule TAKE 1 CAPSULE AT BEDTIME 90 capsule 1     fluticasone (FLONASE) 50 mcg/actuation nasal spray 1 spray each nostril twice daily 1 g 3     No current facility-administered medications for this visit.

## 2021-06-18 NOTE — PATIENT INSTRUCTIONS - HE
Patient Instructions by Sydnie Saleh FNP at 5/5/2020  3:40 PM     Author: Sydnie Saleh FNP Service: -- Author Type: Nurse Practitioner    Filed: 5/5/2020  4:22 PM Encounter Date: 5/5/2020 Status: Signed    : Sydnie Saleh FNP (Nurse Practitioner)       Patient Education     Hives (Adult)  Hives are pink or red bumps on the skin. These bumps are also known as wheals. The bumps can itch, burn, or sting. Hives can occur anywhere on the body. They vary in size and shape and can form in clusters. Individual hives can appear and go away quickly. New hives may develop as old ones fade. Hives are common and usually harmless. Occasionally hives are a sign of a serious allergy.  Hives are often caused by an allergic reaction. It may be an allergic reaction to foods such as fruit, shellfish, chocolate, nuts, or tomatoes. It may be a reaction to pollens, animal fur, or mold spores. Medicines, chemicals, and insect bites can also cause hives. And hives can be caused by hot sun or cold air. The cause of hives can be difficult to find.  You may be given medicines to relieve swelling and itching. Follow all instructions when using these medicines. The hives will usually fade in a few days, but can last up to 2 weeks.  Home care  Follow these tips:    Try to find the cause of the hives and eliminate it. Discuss possible causes with your healthcare provider. Future reactions to the same allergen may be worse.    Dont scratch the hives. Scratching will delay healing. To reduce itching, apply cool, wet compresses to the skin.    Dress in soft, loose cotton clothing.    Dont bathe in hot water. This can make the itching worse.    Apply an ice pack or cool pack wrapped in a thin towel to your skin. This will help reduce redness and itching. But if your hives were caused by exposure to cold, then do not apply more cold to them.    You may use over-the counter antihistamines to reduce itching. Some older  antihistamines, such as diphenhydramine and chlorpheniramine, are inexpensive. But they need to be taken often and may make you sleepy. They are best used at bedtime. Dont use diphenhydramine if you have glaucoma or have trouble urinating because of an enlarged prostate. Newer antihistamines, such as loratadine, cetirizine, and fexofenadine, are generally more expensive. But they tend to have fewer side effects, such as drowsiness. They can be taken less often.    Another type of antihistamine is used to treat heartburn. This type includes ranitidine, nizatidine, famotidine, and cimetidine. These are sometimes used along with the above antihistamines if a single medicine is not working.  Follow-up care  Follow up with your healthcare provider if your symptoms don't get better in 2 days. Ask your provider about allergy testing if you have had a severe reaction, or have had several episodes of hives. He or she can use the allergy testing to find out what you are allergic to.  When to seek medical advice  Call your healthcare provider right away if any of these occur:    Fever of 100.4 F (38.0 C) or higher, or as directed by your healthcare provider    Redness, swelling, or pain    Foul-smelling fluid coming from the rash  Call 911  Call 911 if any of the following occur:    Swelling of the face, throat, or tongue    Trouble breathing or swallowing    Dizziness, weakness, or fainting  Date Last Reviewed: 9/1/2016 2000-2017 [employer]. [address] [place] [address], [place] [address]. All rights reserved. This information is not intended as a substitute for professional medical care. Always follow your healthcare professional's instructions.

## 2021-06-19 NOTE — PROGRESS NOTES
ASSESSMENT/PLAN:  Finger injury, Fracture of phalanx of finger  31-year-old female sustained an injury to her fourth finger of the right hand 3 weeks ago when she tried to open up a wrap around her new mattress that she bought at [employer].  X-ray showed oblique comminuted fracture at the middle phalanx of the fourth finger.  I provided her with a splint and a referral to orthopedic  -     Ambulatory referral to Orthopedic Surgery  -     XR Finger Right 2 or More VWS; Future    Insomnia  Refilled  -     hydrOXYzine pamoate (VISTARIL) 25 MG capsule; TAKE 1 TO 4 CAPSULES BY MOUTH AT BEDTIME    Anxiety  refilled  -     hydrOXYzine pamoate (VISTARIL) 25 MG capsule; TAKE 1 TO 4 CAPSULES BY MOUTH AT BEDTIME    Recurrent UTI  refilled  -     nitrofurantoin (MACRODANTIN) 100 MG capsule; Take 1 capsule following intercourse to prevent UTI      SUBJECTIVE:    Priyanka Madsen is a 32 y.o. female who came in today     Finger injury, Fracture of phalanx of finger  31-year-old female sustained an injury to her fourth finger of the right hand 3 weeks ago when she tried to open up a plastic wrap that was placed around her new mattress that she bought at [employer].  Immediately she had pain, swelling and bruising.  She continues to have pain and swelling hence is here for an evaluation    Insomnia, anxiety  Patient was on doxepin for insomnia.  Doxepin several months ago because her to have presyncopal episodes.  As result she discontinue doxepin and has been taking hydroxyzine 50-75 mg at bedtime for sleep.  This has been helpful.  This also helps with her anxiety.  PHQ9=1, GAD7=1    Recurrent UTI  The patient has recurrent urinary tract infection associated with that of intercourse.  As result she takes nitrofurantoin 1 pill following intercourse which has been helping with her recurrent UTI.  She has seen urology in the past who recommended that she continues with this regimen.  The patient is asking for refill        Review of Systems  (except those mentioned above)  Constitutional: Negative.   HENT: Negative.   Eyes: Negative.   Respiratory: Negative.   Cardiovascular: Negative.   Gastrointestinal: Negative.   Endocrine: Negative.   Genitourinary: Negative.   Musculoskeletal: Negative.   Skin: Negative.   Allergic/Immunologic: Negative.   Neurological: Negative.   Hematological: Negative.   Psychiatric/Behavioral: Negative.     Patient Active Problem List    Diagnosis Date Noted     Insomnia 2016     Anxiety 2014     Major depression, single episode 2014     No Known Allergies  Current Outpatient Prescriptions   Medication Sig Dispense Refill     diphenhydrAMINE (BENADRYL) 25 mg capsule Take 50 mg by mouth as needed for itching.       hydrOXYzine pamoate (VISTARIL) 25 MG capsule TAKE 1 TO 4 CAPSULES BY MOUTH AT BEDTIME 90 capsule 11     multivitamin with minerals (THERA-M) 9 mg iron-400 mcg Tab tablet Take 1 tablet by mouth daily.       valACYclovir (VALTREX) 1000 MG tablet Take one tablet daily 30 tablet 1     nitrofurantoin (MACRODANTIN) 100 MG capsule Take 1 capsule following intercourse to prevent UTI 90 capsule 0     No current facility-administered medications for this visit.      No past medical history on file.  Past Surgical History:   Procedure Laterality Date      SECTION       plantar warts       Social History     Social History     Marital status:      Spouse name: N/A     Number of children: N/A     Years of education: N/A     Social History Main Topics     Smoking status: Never Smoker     Smokeless tobacco: Never Used     Alcohol use Yes     Drug use: None     Sexual activity: Not Asked     Other Topics Concern     None     Social History Narrative     Family History   Problem Relation Age of Onset     Thyroid disease Mother      Thyroid disease Sister      Thyroid disease Maternal Aunt      Thyroid disease Maternal Grandmother          OBJECTIVE:    Vitals:    18 0813   BP: 100/70   Patient  Site: Right Arm   Patient Position: Sitting   Cuff Size: Adult Regular   Pulse: 80   SpO2: 100%   Weight: 128 lb 1.6 oz (58.1 kg)     Body mass index is 21.32 kg/(m^2).    Physical Exam:  Constitutional: Patient was oriented to person, place, and time. Patient appeared well-developed and well-nourished. No distress.   The patient has good eye contact.  No psychomotor retardation or stereotypical behaviors.  Speech was regular rate, regular rhythm, adequate responses.  Mood was stable and affect was congruent mood.  No suicidal or homicidal intent.  No hallucination.  Fourth finger of the right hand show deformity and swelling at the distal and middle phalanx.  Is no bruising or erythema.  She has limited range of motion at the PIP joint.  There is tenderness to palpation at the middle phalanx of the fourth finger of the right hand      Xr Finger Right 2 Or More Vws    Result Date: 8/2/2018  XR FINGER RIGHT 2 OR MORE VWS 8/2/2018 8:53 AM INDICATION: Unspecified injury of unspecified wrist, hand and finger(s), initial encounter COMPARISON: None. FINDINGS: Soft tissue swelling. There is a an oblique comminuted fracture of the middle phalanx of the fourth finger.       A total of 25minutes visit with over 50% of the time spent on counseling the patient about her phalanx fracture, reviewing x-ray, and discussing management.

## 2021-06-20 NOTE — PROGRESS NOTES
ASSESSMENT/PLAN:  Dyspnea  33-year-old non-smoker without any pulmonary disease presented with 10 days of dyspnea.  She was seen at the urgent care [facility] this morning with negative chest x-ray.  I will check CBC, ferritin, CMP, d-dimer, thyroid function.  I suspect her anxiety is also making the dyspnea worse.  She may take hydroxyzine 2-3 times a day to help with anxiety for now.  Further management will depend on the patient's results and her clinical status.  As for the prednisone and albuterol given to her by the [facility] urgent care facility this morning, I do not see a reason to continue with these medication.  She may if she finds it helpful otherwise she does not need to continue with the prednisone and the albuterol.  She verbalized understanding and agreed with the plan  -     HM2(CBC w/o Differential)  -     Comprehensive Metabolic Panel  -     Ferritin  -     Thyroid Stimulating Hormone (TSH)  -     T4, Free  -     D-dimer, Quantitative    Diarrhea  Few days of diarrhea.  I voiced my plans for stool studies but patient declined.  She wants to wait a few more days to see if her loose stools will get better.  This is okay with me.  Continue fluid hydration and nutritional support    SUBJECTIVE:    Priyanka Madsen is a 33 y.o. female who came in today for follow-up.  10 days ago patient developed sudden shortness of breath with chest tightness.  Initially she thought this was due to increasing coffee for which she has reduce it to her normal 1 cup of day.  Nonetheless, the shortness of breath persisted.  She was advised to go to [facility] urgent care this morning.  She had a normal chest x-ray and lab.  CBC showed hemoglobin of 12.2 and MCV of 79.  She was then given 40 mg prednisone to take 3 days and albuterol.  She took 1 dose of the prednisone and has been feeling a little dizzy.  When I asked her about anxiety she thinks that she has been somewhat anxious about her symptoms but  she does not think that the anxiety caused her symptom of shortness of breath.  She denies chest pain.  She denies cough.  She has had a stuffy nose but no fever, chills, myalgia, malaise.  The patient does have a significant family history of thyroid dysfunction namely her mom, aunt, maternal grand mother, and maternal cousins.  The patient has had normal menstrual.  With a 27-day cycle.  She does have anxiety and depression and is not on any medications for this except hydroxyzine for insomnia.  She was vacationing in [place] last month.  Denies leg pain and swelling.  Again denies chest pain.    Diarrhea  For the last few days now the patient has been having diarrhea.  She describes it to be loose stools about 2 times a day.  She denies abdominal pain, nausea, vomiting.  She took a 10-day course of nitrofurantoin for a urinary tract infection that was last month.  She also was vacationing in [place] last month.      Review of Systems (except those mentioned above)  Constitutional: Negative.   HENT: Negative.   Eyes: Negative.   Respiratory: Negative.   Cardiovascular: Negative.   Gastrointestinal: Negative.   Endocrine: Negative.   Genitourinary: Negative.   Musculoskeletal: Negative.   Skin: Negative.   Allergic/Immunologic: Negative.   Neurological: Negative.   Hematological: Negative.   Psychiatric/Behavioral: Negative.     Patient Active Problem List    Diagnosis Date Noted     Insomnia 05/02/2016     Anxiety 12/30/2014     Major depression, single episode 12/30/2014     Allergies   Allergen Reactions     Other Allergy (See Comments) Other (See Comments)     Nasal congestion and swelling     Current Outpatient Prescriptions   Medication Sig Dispense Refill     albuterol (PROAIR HFA;PROVENTIL HFA;VENTOLIN HFA) 90 mcg/actuation inhaler Inhale 2 puffs.       diphenhydrAMINE (BENADRYL) 25 mg capsule Take 50 mg by mouth as needed for itching.       hydrOXYzine pamoate (VISTARIL) 25 MG capsule TAKE 1 TO 4 CAPSULES BY  MOUTH AT BEDTIME 90 capsule 11     multivitamin with minerals (THERA-M) 9 mg iron-400 mcg Tab tablet Take 1 tablet by mouth daily.       nitrofurantoin (MACRODANTIN) 100 MG capsule Take 1 capsule following intercourse to prevent UTI 90 capsule 0     predniSONE (DELTASONE) 20 MG tablet Take 2 tabs po qd for 3 days       valACYclovir (VALTREX) 1000 MG tablet Take one tablet daily 30 tablet 1     No current facility-administered medications for this visit.      No past medical history on file.  Past Surgical History:   Procedure Laterality Date      SECTION       plantar warts       Social History     Social History     Marital status:      Spouse name: N/A     Number of children: N/A     Years of education: N/A     Social History Main Topics     Smoking status: Never Smoker     Smokeless tobacco: Never Used     Alcohol use Yes     Drug use: None     Sexual activity: Not Asked     Other Topics Concern     None     Social History Narrative     Family History   Problem Relation Age of Onset     Thyroid disease Mother      Thyroid disease Sister      Thyroid disease Maternal Aunt      Thyroid disease Maternal Grandmother          OBJECTIVE:    Vitals:    10/01/18 1307   BP: 92/58   Patient Site: Left Arm   Patient Position: Sitting   Cuff Size: Adult Regular   Pulse: 81   Temp: 98.1  F (36.7  C)   TempSrc: Oral   SpO2: 100%   Weight: 129 lb (58.5 kg)     Body mass index is 21.47 kg/(m^2).    Physical Exam:  Constitutional: Patient was oriented to person, place, and time. Patient appeared well-developed and well-nourished. No distress.   Head: Normocephalic and atraumatic.   Right Ear: External ear normal. Normal TM  Left Ear: External ear normal. Normal TM  Nose: Nose normal.   Mouth/Throat: Oropharynx was clear and moist. No oropharyngeal exudate.   Eyes: Conjunctivae and EOM were normal. Pupils were equal, round, and reactive to light. Right eye exhibited no discharge. Left eye exhibited no discharge. No  scleral icterus.   Neck: Neck supple. No JVD present. No tracheal deviation present. No thyromegaly present.   Lymphadenopathy:  Patient has no cervical adenopathy.   Cardiovascular: Normal rate, regular rhythm, normal heart sounds and intact distal pulses. No murmur heard.   Pulmonary/Chest: Effort normal and breath sounds normal. No stridor. No respiratory distress. Patient had no wheezes, no rales, exhibits no tenderness.   Skin: Skin was warm and dry. No rash noted. Patient was not diaphoretic. No erythema. No pallor.       Results for orders placed or performed in visit on 10/01/18   HM2(CBC w/o Differential)   Result Value Ref Range    WBC 8.3 4.0 - 11.0 thou/uL    RBC 5.05 3.80 - 5.40 mill/uL    Hemoglobin 12.5 12.0 - 16.0 g/dL    Hematocrit 38.5 35.0 - 47.0 %    MCV 76 (L) 80 - 100 fL    MCH 24.7 (L) 27.0 - 34.0 pg    MCHC 32.4 32.0 - 36.0 g/dL    RDW 11.3 11.0 - 14.5 %    Platelets 226 140 - 440 thou/uL    MPV 7.4 7.0 - 10.0 fL

## 2021-06-21 NOTE — PROGRESS NOTES
ASSESSMENT/PLAN:    Mild recurrent major depression (H), Anxiety  33-year-old female presents today for a follow-up.  Effexor was just started last month and now increased to 75 mg.  At this 75 mg she feels better.  She still has some anxious panic attack episodes.  She relates these episodes to that of the demands at her workplace.  She is seen a psychotherapist whenever she has the time.  I will keep her on the same dose of the Effexor at 75 mg but was encouraged that she sees her therapist on a weekly basis.  We spoke about internal locus of control and motivational interviewing was utilized today.  She may use hydroxyzine for anxiety panic attack during the daytime.  Follow-up in 1 month.  -     venlafaxine (EFFEXOR-XR) 75 MG 24 hr capsule; Take 1 capsule (75 mg total) by mouth daily.    Insomnia  Stable at 75 mg of hydroxyzine    SUBJECTIVE:    Priyanka Madsen is a 33 y.o. female who came in today at my request for follow-up.  The patient was started on 37.5 mg of Effexor last month for anxiety and depression.  Effexor was then increased to 75 mg.  She comes in today for follow-up.  Overall, she states that Effexor has been helping with her depression and anxiety symptoms.  Occasionally she still has anxious days and panic attacks.  The symptoms tend to manifest himself as dyspnea or chest tightness.  She states that her triggers are that of her work responsibilities and demands.  She states that she feels like she is juggling a lot with appointments, work responsibilities, and her family.  She sees her therapist when needed and that usually once every month or other month.  She has no suicidal homicidal ideation.    The patient has struggled with sleep for a long period of time.  She had tried various sleep medicines over the periods of many years.  At this point in time, she is taking hydroxyzine 75 mg at bedtime.  This current dose is helpful.    Review of Systems (except those mentioned  above)  Constitutional: Negative.   HENT: Negative.   Eyes: Negative.   Respiratory: Negative.   Cardiovascular: Negative.   Gastrointestinal: Negative.   Endocrine: Negative.   Genitourinary: Negative.   Musculoskeletal: Negative.   Skin: Negative.   Allergic/Immunologic: Negative.   Neurological: Negative.   Hematological: Negative.   Psychiatric/Behavioral: Negative.     Patient Active Problem List    Diagnosis Date Noted     Mild recurrent major depression (H) 2018     Insomnia 2016     Anxiety 2014     Allergies   Allergen Reactions     Other Allergy (See Comments) Other (See Comments)     Nasal congestion and swelling     Current Outpatient Prescriptions   Medication Sig Dispense Refill     diphenhydrAMINE (BENADRYL) 25 mg capsule Take 50 mg by mouth as needed for itching.       hydrOXYzine pamoate (VISTARIL) 25 MG capsule TAKE 1 TO 4 CAPSULES BY MOUTH AT BEDTIME 90 capsule 11     multivitamin with minerals (THERA-M) 9 mg iron-400 mcg Tab tablet Take 1 tablet by mouth daily.       nitrofurantoin (MACRODANTIN) 100 MG capsule Take 1 capsule following intercourse to prevent UTI 90 capsule 0     valACYclovir (VALTREX) 1000 MG tablet Take one tablet daily 30 tablet 1     venlafaxine (EFFEXOR-XR) 75 MG 24 hr capsule Take 1 capsule (75 mg total) by mouth daily. 90 capsule 0     No current facility-administered medications for this visit.      No past medical history on file.  Past Surgical History:   Procedure Laterality Date      SECTION       plantar warts       Social History     Social History     Marital status:      Spouse name: N/A     Number of children: N/A     Years of education: N/A     Social History Main Topics     Smoking status: Never Smoker     Smokeless tobacco: Never Used     Alcohol use Yes     Drug use: None     Sexual activity: Not Asked     Other Topics Concern     None     Social History Narrative     Family History   Problem Relation Age of Onset     Thyroid  disease Mother      Thyroid disease Sister      Thyroid disease Maternal Aunt      Thyroid disease Maternal Grandmother          OBJECTIVE:    Vitals:    11/06/18 0820   BP: 100/68   Patient Site: Left Arm   Patient Position: Sitting   Cuff Size: Adult Regular   Pulse: 76   Weight: 132 lb 1 oz (59.9 kg)     Body mass index is 21.98 kg/(m^2).    Physical Exam:  MENTAL STATUS EXAM  BEHAVIOR/APPEARANCE    Inappropriate sexual behavior: Absent    General appearance: Awake, alert, appropriately groomed, in no acute distress.    Gait: no imbalance   Social Skills: appropriate    Eye Contact:  Good    Motor Activity: no psychomotor retardation, restrained and sterotypic   SPEECH: Normal, rate, rhythm, and volume, adequate responses   ORIENTATION: Patient is oriented x3   MOOD & AFFECT    Mood: depressed   Affect: tense, frustrated  THOUGHT PROCESS    Thought Process: adequate rate    Language: English   Fund of Knowledge: average    Thought Content: circumstantial   Association: adequate   Judgement: adequate   Insight: adequate   COGNITIVE EXAM    Cognitive Function: Oriented to person, place, time, and situation.    Recent/Remote Memory: not assessed    Intelligence: not assessed    Attention Span: not  assessed    Concentration: not assessed    Computation: not assessed        Results for orders placed or performed in visit on 10/01/18   HM2(CBC w/o Differential)   Result Value Ref Range    WBC 8.3 4.0 - 11.0 thou/uL    RBC 5.05 3.80 - 5.40 mill/uL    Hemoglobin 12.5 12.0 - 16.0 g/dL    Hematocrit 38.5 35.0 - 47.0 %    MCV 76 (L) 80 - 100 fL    MCH 24.7 (L) 27.0 - 34.0 pg    MCHC 32.4 32.0 - 36.0 g/dL    RDW 11.3 11.0 - 14.5 %    Platelets 226 140 - 440 thou/uL    MPV 7.4 7.0 - 10.0 fL   Comprehensive Metabolic Panel   Result Value Ref Range    Sodium 140 136 - 145 mmol/L    Potassium 4.3 3.5 - 5.0 mmol/L    Chloride 106 98 - 107 mmol/L    CO2 27 22 - 31 mmol/L    Anion Gap, Calculation 7 5 - 18 mmol/L    Glucose 115 70  - 125 mg/dL    BUN 19 8 - 22 mg/dL    Creatinine 0.79 0.60 - 1.10 mg/dL    GFR MDRD Af Amer >60 >60 mL/min/1.73m2    GFR MDRD Non Af Amer >60 >60 mL/min/1.73m2    Bilirubin, Total 2.0 (H) 0.0 - 1.0 mg/dL    Calcium 10.0 8.5 - 10.5 mg/dL    Protein, Total 7.1 6.0 - 8.0 g/dL    Albumin 4.2 3.5 - 5.0 g/dL    Alkaline Phosphatase 32 (L) 45 - 120 U/L    AST 12 0 - 40 U/L    ALT 11 0 - 45 U/L   Ferritin   Result Value Ref Range    Ferritin 97 10 - 130 ng/mL   Thyroid Stimulating Hormone (TSH)   Result Value Ref Range    TSH 1.22 0.30 - 5.00 uIU/mL   T4, Free   Result Value Ref Range    Free T4 1.1 0.7 - 1.8 ng/dL   D-dimer, Quantitative   Result Value Ref Range    D-Dimer, Quant <=0.27 <=0.50 FEU ug/mL

## 2021-06-23 NOTE — TELEPHONE ENCOUNTER
Refill Approved    Rx renewed per Medication Renewal Policy. Medication was last renewed on 8/2/18     Nico Urias, [facility] Triage/Med Refill 1/25/2019     Requested Prescriptions   Pending Prescriptions Disp Refills     hydrOXYzine pamoate (VISTARIL) 25 MG capsule [Pharmacy Med Name: HYDROXYZINE LINDA 25 MG CAP] 60 capsule 4     Sig: TAKE 1 TO 4 CAPSULES BY MOUTH AT BEDTIME    Antihistamine Refill Protocol Passed - 1/23/2019  4:32 PM       Passed - Patient has had office visit/physical in last year    Last office visit with prescriber/PCP: 4/27/2018 Emeli Yoder NP OR same dept: 11/6/2018 Catarino Earl MD OR same specialty: 11/6/2018 Catarino Earl MD  Last physical: Visit date not found Last MTM visit: Visit date not found   Next visit within 3 mo: Visit date not found  Next physical within 3 mo: Visit date not found  Prescriber OR PCP: Emeli Yoder NP  Last diagnosis associated with med order: 1. Insomnia  - hydrOXYzine pamoate (VISTARIL) 25 MG capsule [Pharmacy Med Name: HYDROXYZINE LINDA 25 MG CAP]; TAKE 1 TO 4 CAPSULES BY MOUTH AT BEDTIME  Dispense: 60 capsule; Refill: 4    2. Anxiety  - hydrOXYzine pamoate (VISTARIL) 25 MG capsule [Pharmacy Med Name: HYDROXYZINE LINDA 25 MG CAP]; TAKE 1 TO 4 CAPSULES BY MOUTH AT BEDTIME  Dispense: 60 capsule; Refill: 4    If protocol passes may refill for 12 months if within 3 months of last provider visit (or a total of 15 months).

## 2021-06-24 NOTE — TELEPHONE ENCOUNTER
Patient Returning Call  Reason for call:  Return call  Information relayed to patient:  Patient was informed she is due for appointment prior to a refill of her venlafaxine. Patient stated she will call back to schedule.   Patient has additional questions:  Yes  If YES, what are your questions/concerns:  Please refill patient's medication once she schedules an appointment.  Okay to leave a detailed message?: No call back needed

## 2021-06-24 NOTE — TELEPHONE ENCOUNTER
RN cannot approve Refill Request    RN can NOT refill this medication overdue for office visits and/or labs.    Bart Mckenzie, [facility] Triage/Med Refill 3/6/2019    Requested Prescriptions   Pending Prescriptions Disp Refills     venlafaxine (EFFEXOR-XR) 75 MG 24 hr capsule [Pharmacy Med Name: VENLAFAXINE HCL ER 75 MG CAP] 30 capsule 0     Sig: TAKE 1 CAPSULE BY MOUTH EVERY DAY    Venlafaxine/Desvenlafaxine Refill Protocol Failed - 3/6/2019  1:29 PM       Failed - Fasting lipid cascade in last year    No results found for: CHOL, TRIG, HDL, LDLCALC, FASTING         Passed - LFT or AST or ALT in last year    Albumin   Date Value Ref Range Status   10/01/2018 4.2 3.5 - 5.0 g/dL Final     Bilirubin, Total   Date Value Ref Range Status   10/01/2018 2.0 (H) 0.0 - 1.0 mg/dL Final     Alkaline Phosphatase   Date Value Ref Range Status   10/01/2018 32 (L) 45 - 120 U/L Final     AST   Date Value Ref Range Status   10/01/2018 12 0 - 40 U/L Final     ALT   Date Value Ref Range Status   10/01/2018 11 0 - 45 U/L Final     Protein, Total   Date Value Ref Range Status   10/01/2018 7.1 6.0 - 8.0 g/dL Final               Passed - PCP or prescribing provider visit in last year    Last office visit with prescriber/PCP: 11/6/2018 Catarino Earl MD OR same dept: 11/6/2018 Catarino Earl MD OR same specialty: 11/6/2018 Catarino Earl MD  Last physical: Visit date not found Last MTM visit: Visit date not found   Next visit within 3 mo: Visit date not found  Next physical within 3 mo: Visit date not found  Prescriber OR PCP: Catarino Walker MD  Last diagnosis associated with med order: 1. Anxiety  - venlafaxine (EFFEXOR-XR) 75 MG 24 hr capsule [Pharmacy Med Name: VENLAFAXINE HCL ER 75 MG CAP]; TAKE 1 CAPSULE BY MOUTH EVERY DAY  Dispense: 30 capsule; Refill: 0    If protocol passes may refill for 12 months if within 3 months of last provider visit (or a total of 15  months).            Passed - Blood Pressure in last year    BP Readings from Last 1 Encounters:   11/06/18 100/68

## 2021-06-24 NOTE — TELEPHONE ENCOUNTER
RN cannot approve Refill Request    RN can NOT refill this medication med is not covered by policy/route to provider. Last office visit: 11/6/2018 Catarino Earl MD Last Physical: Visit date not found Last MTM visit: Visit date not found Last visit same specialty: 11/6/2018 Catarino Earl MD.  Next visit within 3 mo: Visit date not found  Next physical within 3 mo: Visit date not found      Elizabeth Collado, [facility] Triage/Med Refill 2/16/2019    Requested Prescriptions   Pending Prescriptions Disp Refills     nitrofurantoin (MACRODANTIN) 100 MG capsule [Pharmacy Med Name: NITROFUR MAC CAP 100MG] 90 capsule 0     Sig: TAKE 1 CAPSULE FOLLOWING   INTERCOURSE TO PREVENT     URINARY TRACT INFECTION    There is no refill protocol information for this order

## 2021-06-24 NOTE — TELEPHONE ENCOUNTER
Last Refill: 12/27/18 #30, 0 refills  Last OV: Mild recurrent major depression (H), Anxiety  33-year-old female presents today for a follow-up.  Effexor was just started last month and now increased to 75 mg.  At this 75 mg she feels better.  She still has some anxious panic attack episodes.  She relates these episodes to that of the demands at her workplace.  She is seen a psychotherapist whenever she has the time.  I will keep her on the same dose of the Effexor at 75 mg but was encouraged that she sees her therapist on a weekly basis.  We spoke about internal locus of control and motivational interviewing was utilized today.  She may use hydroxyzine for anxiety panic attack during the daytime.  Follow-up in 1 month.  -     venlafaxine (EFFEXOR-XR) 75 MG 24 hr capsule; Take 1 capsule (75 mg total) by mouth daily.  No appt scheduled at this time.  LMTCB for pt.    Please inform pt that she is due for appt for further refills.

## 2021-06-24 NOTE — TELEPHONE ENCOUNTER
RN cannot approve Refill Request    RN can NOT refill this medication PCP messaged that patient is overdue for Labs.       Candy Hill, [facility] Triage/Med Refill 3/1/2019    Requested Prescriptions   Pending Prescriptions Disp Refills     venlafaxine (EFFEXOR-XR) 75 MG 24 hr capsule 90 capsule 0     Sig: Take 1 capsule (75 mg total) by mouth daily.    Venlafaxine/Desvenlafaxine Refill Protocol Failed - 3/1/2019  1:14 PM       Failed - Fasting lipid cascade in last year    No results found for: CHOL, TRIG, HDL, LDLCALC, FASTING         Passed - LFT or AST or ALT in last year    Albumin   Date Value Ref Range Status   10/01/2018 4.2 3.5 - 5.0 g/dL Final     Bilirubin, Total   Date Value Ref Range Status   10/01/2018 2.0 (H) 0.0 - 1.0 mg/dL Final     Alkaline Phosphatase   Date Value Ref Range Status   10/01/2018 32 (L) 45 - 120 U/L Final     AST   Date Value Ref Range Status   10/01/2018 12 0 - 40 U/L Final     ALT   Date Value Ref Range Status   10/01/2018 11 0 - 45 U/L Final     Protein, Total   Date Value Ref Range Status   10/01/2018 7.1 6.0 - 8.0 g/dL Final               Passed - PCP or prescribing provider visit in last year    Last office visit with prescriber/PCP: 11/6/2018 Catarino Earl MD OR same dept: 11/6/2018 Catarino Earl MD OR same specialty: 11/6/2018 Catarino Earl MD  Last physical: Visit date not found Last MTM visit: Visit date not found   Next visit within 3 mo: Visit date not found  Next physical within 3 mo: Visit date not found  Prescriber OR PCP: Catarino Walker MD  Last diagnosis associated with med order: 1. Anxiety  - venlafaxine (EFFEXOR-XR) 75 MG 24 hr capsule; Take 1 capsule (75 mg total) by mouth daily.  Dispense: 90 capsule; Refill: 0    If protocol passes may refill for 12 months if within 3 months of last provider visit (or a total of 15 months).            Passed - Blood Pressure in last year    BP Readings from  Last 1 Encounters:   11/06/18 100/68

## 2021-09-26 ENCOUNTER — HEALTH MAINTENANCE LETTER (OUTPATIENT)
Age: 36
End: 2021-09-26

## 2022-01-03 DIAGNOSIS — F51.01 PRIMARY INSOMNIA: ICD-10-CM

## 2022-01-03 DIAGNOSIS — L50.9 HIVES: Primary | ICD-10-CM

## 2022-01-05 RX ORDER — HYDROXYZINE PAMOATE 25 MG/1
CAPSULE ORAL
Qty: 270 CAPSULE | Refills: 1 | Status: SHIPPED | OUTPATIENT
Start: 2022-01-05 | End: 2023-08-15

## 2022-01-05 NOTE — TELEPHONE ENCOUNTER
"Outpatient Medication Detail     Disp Refills Start End VASILE   hydrOXYzine pamoate (VISTARIL) 25 MG capsule 270 capsule 2 1/28/2021  No   Sig - Route: Take 1 capsule (25 mg total) by mouth 3 (three) times a day as needed for itching. - Oral   Sent to pharmacy as: hydrOXYzine pamoate 25 mg capsule (VISTARIL)   E-Prescribing Status: Receipt confirmed by pharmacy (1/28/2021  4:30 PM CST)        Last office visit provider:  6/30/21     Requested Prescriptions   Pending Prescriptions Disp Refills     hydrOXYzine (VISTARIL) 25 MG capsule [Pharmacy Med Name: HYDROXYZINE LINDA 25 MG CAP] 270 capsule      Sig: TAKE 1 CAPSULE BY MOUTH 3 (THREE) TIMES A DAY AS NEEDED FOR ITCHING.       Antihistamines Protocol Failed - 1/3/2022 12:34 AM        Failed - Medication is active on med list        Passed - Recent (12 mo) or future (30 days) visit within the authorizing provider's specialty     Patient has had an office visit with the authorizing provider or a provider within the authorizing providers department within the previous 12 mos or has a future within next 30 days. See \"Patient Info\" tab in inbasket, or \"Choose Columns\" in Meds & Orders section of the refill encounter.              Passed - Patient is age 3 or older     Apply age and/or weight-based dosing for peds patients age 3 and older.    Forward request to provider for patients under the age of 3.               Dandy Gutierrez RN 01/05/22 9:55 AM  "

## 2022-01-16 ENCOUNTER — HEALTH MAINTENANCE LETTER (OUTPATIENT)
Age: 37
End: 2022-01-16

## 2022-02-10 ENCOUNTER — VIRTUAL VISIT (OUTPATIENT)
Dept: FAMILY MEDICINE | Facility: CLINIC | Age: 37
End: 2022-02-10
Payer: COMMERCIAL

## 2022-02-10 DIAGNOSIS — F41.9 ANXIETY: Primary | ICD-10-CM

## 2022-02-10 PROCEDURE — 99214 OFFICE O/P EST MOD 30 MIN: CPT | Mod: GT | Performed by: FAMILY MEDICINE

## 2022-02-10 RX ORDER — VENLAFAXINE HYDROCHLORIDE 37.5 MG/1
CAPSULE, EXTENDED RELEASE ORAL
COMMUNITY
Start: 2021-01-28 | End: 2023-08-15

## 2022-02-10 RX ORDER — BUSPIRONE HYDROCHLORIDE 7.5 MG/1
7.5 TABLET ORAL 2 TIMES DAILY
Qty: 60 TABLET | Refills: 1 | Status: SHIPPED | OUTPATIENT
Start: 2022-02-10 | End: 2022-03-01

## 2022-02-10 ASSESSMENT — ANXIETY QUESTIONNAIRES
6. BECOMING EASILY ANNOYED OR IRRITABLE: NOT AT ALL
3. WORRYING TOO MUCH ABOUT DIFFERENT THINGS: NOT AT ALL
IF YOU CHECKED OFF ANY PROBLEMS ON THIS QUESTIONNAIRE, HOW DIFFICULT HAVE THESE PROBLEMS MADE IT FOR YOU TO DO YOUR WORK, TAKE CARE OF THINGS AT HOME, OR GET ALONG WITH OTHER PEOPLE: NOT DIFFICULT AT ALL
5. BEING SO RESTLESS THAT IT IS HARD TO SIT STILL: NOT AT ALL
2. NOT BEING ABLE TO STOP OR CONTROL WORRYING: NOT AT ALL
4. TROUBLE RELAXING: NOT AT ALL
GAD7 TOTAL SCORE: 0
7. FEELING AFRAID AS IF SOMETHING AWFUL MIGHT HAPPEN: NOT AT ALL
1. FEELING NERVOUS, ANXIOUS, OR ON EDGE: NOT AT ALL

## 2022-02-10 ASSESSMENT — PATIENT HEALTH QUESTIONNAIRE - PHQ9: SUM OF ALL RESPONSES TO PHQ QUESTIONS 1-9: 0

## 2022-02-10 NOTE — PROGRESS NOTES
Priyanka is a 36 year old who is being evaluated via a billable video visit.      How would you like to obtain your AVS? MyChart    Will anyone else be joining your video visit? No      Video Start Time: 12:44 PM    Assessment & Plan     Anxiety  Wean off effexor  Start buspar  Therapy appt to be scheduled  Motivational interviewing was utilized today.  Modified cognitive behavioral therapy was performed with counseling on internal locus of control.  Discussed importance of self care, sleep, nutrition, hydration, exercise, and mindfulness   - busPIRone (BUSPAR) 7.5 MG tablet  Dispense: 60 tablet; Refill: 1    Shoua Paula Walker MD  [facility]   Priyanka is a 36 year old who presents for the following health issues     HPI     Here to discuss effexor  Still with night sweats  effexor helps with anxiety but does not like the side effect  Thinks she needs med for anxiety so doesn't want to go completely without any med  PHQ9=0, GAD7=0        Objective           Vitals:  No vitals were obtained today due to virtual visit.    Physical Exam   Constitutional: Patient is oriented to person, place, and time. Patient appears well-developed and well-nourished. No distress.   Head: Normocephalic and atraumatic.   Right Ear: External ear normal.   Left Ear: External ear normal.   Eyes: Conjunctivae and EOM are normal. Right eye exhibits no discharge. Left eye exhibits no discharge. No scleral icterus.   Neurological: Patient is alert and oriented to person, place, and time.  Skin: No rash noted. Patient is not diaphoretic. No erythema. No pallor.    The patient has good eye contact.  No psychomotor retardation or stereotypical behaviors.  Speech was regular rate, regular rhythm, adequate responses.  Mood was stable and affect was congruent mood.  No suicidal or homicidal intent.  No hallucination.      Video-Visit Details    Type of service:  Video Visit    Video End  Time:12:50 PM    Originating Location (pt. Location): Home    Distant Location (provider location):  [facility]     Platform used for Video Visit: [facility]

## 2022-02-20 ENCOUNTER — E-VISIT (OUTPATIENT)
Dept: FAMILY MEDICINE | Facility: CLINIC | Age: 37
End: 2022-02-20
Payer: COMMERCIAL

## 2022-02-20 DIAGNOSIS — Z53.9 ERRONEOUS ENCOUNTER--DISREGARD: Primary | ICD-10-CM

## 2022-02-22 ENCOUNTER — VIRTUAL VISIT (OUTPATIENT)
Dept: FAMILY MEDICINE | Facility: CLINIC | Age: 37
End: 2022-02-22
Payer: COMMERCIAL

## 2022-02-22 DIAGNOSIS — R11.0 NAUSEA: Primary | ICD-10-CM

## 2022-02-22 PROCEDURE — 99214 OFFICE O/P EST MOD 30 MIN: CPT | Mod: GT | Performed by: FAMILY MEDICINE

## 2022-02-22 RX ORDER — ONDANSETRON 8 MG/1
8 TABLET, ORALLY DISINTEGRATING ORAL EVERY 8 HOURS PRN
Qty: 30 TABLET | Refills: 0 | Status: SHIPPED | OUTPATIENT
Start: 2022-02-22 | End: 2022-03-01

## 2022-02-22 NOTE — PROGRESS NOTES
Priyanka is a 36 year old who is being evaluated via a billable video visit.      How would you like to obtain your AVS? MyChart    Will anyone else be joining your video visit? No      Video Start Time: 12:49 PM    Assessment & Plan     Nausea, diarrhea  zofran RX  Supportive cares including hydration and bland diet  Keep effexor (every other day) and buspar (BID) as it for now  Follow-up in 1 wk  - ondansetron (ZOFRAN-ODT) 8 MG ODT tab  Dispense: 30 tablet; Refill: 0      Shoua Paula Walker MD  [facility]    Subjective   Priyanka is a 36 year old who presents for the following health issues     HPI     Nausea, vomiting, diarrhea started few days ago (2/15/22)  Weaning off effexor (taking every other day)  At the same time, adding on buspar BID  At the same time, with stuffy nose, sore throat with her and her family members  Negative covid test  Today with nausea and diarrhea  Mental health is good. No anxiety. Sleeping better        Objective           Vitals:  No vitals were obtained today due to virtual visit.    Physical Exam   We discussed healthy lifestyle, nutrition, cardiovascular risk reduction, self care, safety, sunscreen, and timing of cancer screening.  Health maintenance screening and immunizations reviewed with the patient.  Follow up yearly for the annual physical.    The patient has good eye contact.  No psychomotor retardation or stereotypical behaviors.  Speech was regular rate, regular rhythm, adequate responses.  Mood was stable and affect was congruent mood.  No suicidal or homicidal intent.  No hallucination.    Video-Visit Details    Type of service:  Video Visit    Video End Time:12:56 PM    Originating Location (pt. Location): Home    Distant Location (provider location):  [facility]     Platform used for Video Visit: [employer]

## 2022-03-01 ENCOUNTER — VIRTUAL VISIT (OUTPATIENT)
Dept: FAMILY MEDICINE | Facility: CLINIC | Age: 37
End: 2022-03-01
Payer: COMMERCIAL

## 2022-03-01 DIAGNOSIS — R11.0 NAUSEA: ICD-10-CM

## 2022-03-01 DIAGNOSIS — F41.9 ANXIETY: ICD-10-CM

## 2022-03-01 PROCEDURE — 99214 OFFICE O/P EST MOD 30 MIN: CPT | Mod: GT | Performed by: FAMILY MEDICINE

## 2022-03-01 RX ORDER — ONDANSETRON 8 MG/1
8 TABLET, ORALLY DISINTEGRATING ORAL EVERY 8 HOURS PRN
Qty: 90 TABLET | Refills: 0 | Status: SHIPPED | OUTPATIENT
Start: 2022-03-01 | End: 2023-08-15

## 2022-03-01 RX ORDER — BUSPIRONE HYDROCHLORIDE 7.5 MG/1
7.5 TABLET ORAL 2 TIMES DAILY
Qty: 180 TABLET | Refills: 0 | Status: SHIPPED | OUTPATIENT
Start: 2022-03-01 | End: 2022-05-29

## 2022-03-01 ASSESSMENT — PATIENT HEALTH QUESTIONNAIRE - PHQ9: SUM OF ALL RESPONSES TO PHQ QUESTIONS 1-9: 0

## 2022-03-01 ASSESSMENT — ANXIETY QUESTIONNAIRES
5. BEING SO RESTLESS THAT IT IS HARD TO SIT STILL: NOT AT ALL
1. FEELING NERVOUS, ANXIOUS, OR ON EDGE: NOT AT ALL
IF YOU CHECKED OFF ANY PROBLEMS ON THIS QUESTIONNAIRE, HOW DIFFICULT HAVE THESE PROBLEMS MADE IT FOR YOU TO DO YOUR WORK, TAKE CARE OF THINGS AT HOME, OR GET ALONG WITH OTHER PEOPLE: NOT DIFFICULT AT ALL
4. TROUBLE RELAXING: NOT AT ALL
7. FEELING AFRAID AS IF SOMETHING AWFUL MIGHT HAPPEN: NOT AT ALL
3. WORRYING TOO MUCH ABOUT DIFFERENT THINGS: NOT AT ALL
2. NOT BEING ABLE TO STOP OR CONTROL WORRYING: NOT AT ALL
GAD7 TOTAL SCORE: 0
6. BECOMING EASILY ANNOYED OR IRRITABLE: NOT AT ALL

## 2022-03-01 NOTE — PROGRESS NOTES
Priyanka is a 36 year old who is being evaluated via a billable video visit.      How would you like to obtain your AVS? MyChart  If the video visit is dropped, the invitation should be resent by: Text to cell phone: [phone]   Will anyone else be joining your video visit? No      Video Start Time: 9:11 AM    Assessment & Plan     Nausea, vomiting, diarrhea, congestion  Refilled zofran  Add flonase  Stop sudafed as she has been taking it for more than 2 wks  Continue with mucinex  Supportive cares, zinc, vit C, vit D  Follow-up prn  - ondansetron (ZOFRAN-ODT) 8 MG ODT tab  Dispense: 90 tablet; Refill: 0    Depression/anxiety  Continue to wean off effexor  Continue with buspar BID  Follow-up  1 month    Catarino Walker MD  [facility]    Subjective   Priyanka is a 36 year old who presents for the following health issues     HPI     Here for follow-up  Diarrhea is better. Frequent but now more firm stools. Still with cramps  Still with nausea and vomiting. zofran helps  Still nasal congestion, cough, runny nose. Taking sudafed &  mucinex  Negative covid test  Other family members are sick with similar symptoms    Mental health is good  Still with effexor every other day, weaning off  buspar BID        Objective    Vitals - Patient Reported  Weight (Patient Reported): 62.6 kg (138 lb)  Temperature (Patient Reported): 96.8  F (36  C) (Temporal)        Physical Exam       Video-Visit Details    Type of service:  Video Visit    Video End Time:9:19 AM    Originating Location (pt. Location): Home    Distant Location (provider location):  [facility]     Platform used for Video Visit: [facility]

## 2022-03-04 ENCOUNTER — VIRTUAL VISIT (OUTPATIENT)
Dept: URGENT CARE | Facility: CLINIC | Age: 37
End: 2022-03-04
Payer: COMMERCIAL

## 2022-03-04 DIAGNOSIS — R09.89 CHEST CONGESTION: ICD-10-CM

## 2022-03-04 DIAGNOSIS — R10.84 ABDOMINAL PAIN, GENERALIZED: Primary | ICD-10-CM

## 2022-03-04 PROCEDURE — 99207 PR NO CHARGE LOS: CPT

## 2022-03-04 NOTE — PROGRESS NOTES
"Priyanka is a 36 year old female who presents for a billable telephone visit.   ASSESSMENT/PLAN:  Diagnoses and all orders for this visit:    Abdominal pain, generalized    Chest congestion    I recommend Priyanka be seen in person for her abdominal pain. I do not recommend giving antibiotic with unknown cause of the abdominal pain.     SUBJECTIVE:  Priyanka calls with reports of 3 week history of cough and congestion. She reports she has tested negative for COVID. It started as a sore throat, then vomiting and diarrhea. She then started having congestion, coughing, drainage. She vomited last time last Sunday. She has overall improved and she was given Zofran which offered relief. Diarrhea has improved. She is getting worsening intestinal cramping that is \"very painful\" and stopping her from doing things. She has been taking Gas X, Zofran and anti diarrheal OTC. She has been also taking Tylenol. She has also tried Pepto Bismol. 10 years ago she had a similar bout of diarrhea after a cold and had to be placed on antibiotics. No one else in the family has had these problems.   She is requesting antibiotic.   ROS: Pertinent ROS neg other than the symptoms noted above in the HPI.     OBJECTIVE:  Vitals not done due to this being a virtual visit  GEN: No distress  RESP: No cough, no audible wheezing, able to talk in full sentences  Remainder of exam unable to be completed due to telephone visits    Epiafnio Canales PA-C    Call length: 6 minutes    "

## 2022-05-28 DIAGNOSIS — F41.9 ANXIETY: ICD-10-CM

## 2022-05-29 RX ORDER — BUSPIRONE HYDROCHLORIDE 7.5 MG/1
TABLET ORAL
Qty: 180 TABLET | Refills: 2 | Status: SHIPPED | OUTPATIENT
Start: 2022-05-29 | End: 2023-03-01

## 2022-05-30 NOTE — TELEPHONE ENCOUNTER
"Last Written Prescription Date:  3/1/22  Last Fill Quantity: 180,  # refills: 0   Last office visit provider:  3/1/22     Requested Prescriptions   Pending Prescriptions Disp Refills     busPIRone (BUSPAR) 7.5 MG tablet [Pharmacy Med Name: BUSPIRONE HCL 7.5 MG TABLET] 180 tablet 0     Sig: TAKE 1 TABLET BY MOUTH 2 TIMES DAILY.       Atypical Antidepressants Protocol Passed - 5/28/2022  7:38 AM        Passed - Recent (12 mo) or future (30 days) visit within the authorizing provider's specialty     Patient has had an office visit with the authorizing provider or a provider within the authorizing providers department within the previous 12 mos or has a future within next 30 days. See \"Patient Info\" tab in inbasket, or \"Choose Columns\" in Meds & Orders section of the refill encounter.              Passed - Medication active on med list        Passed - Patient is age 18 or older        Passed - No active pregnancy on record        Passed - No positive pregnancy test in past 12 mos             Candy Hill RN 05/29/22 7:20 PM  "

## 2023-04-23 ENCOUNTER — HEALTH MAINTENANCE LETTER (OUTPATIENT)
Age: 38
End: 2023-04-23

## 2023-05-24 ENCOUNTER — OFFICE VISIT (OUTPATIENT)
Dept: PODIATRY | Facility: CLINIC | Age: 38
End: 2023-05-24
Payer: COMMERCIAL

## 2023-05-24 VITALS
TEMPERATURE: 98.6 F | RESPIRATION RATE: 10 BRPM | HEART RATE: 83 BPM | SYSTOLIC BLOOD PRESSURE: 102 MMHG | DIASTOLIC BLOOD PRESSURE: 71 MMHG

## 2023-05-24 DIAGNOSIS — Z53.9 ERRONEOUS ENCOUNTER--DISREGARD: Primary | ICD-10-CM

## 2023-05-24 ASSESSMENT — PAIN SCALES - GENERAL: PAINLEVEL: MILD PAIN (3)

## 2023-05-24 NOTE — LETTER
5/24/2023         RE: Priyanka Madsen  [address] [address]        Dear Colleague,    Thank you for referring your patient, Priyanka Madsen, to the [facility]. Please see a copy of my visit note below.      This encounter was opened in error. Please disregard.      Again, thank you for allowing me to participate in the care of your patient.        Sincerely,        Eleazar Bello DPM

## 2023-05-30 DIAGNOSIS — F41.9 ANXIETY: ICD-10-CM

## 2023-05-30 NOTE — TELEPHONE ENCOUNTER
"Former patient of [facility] & has not established care with another provider.  Please assign refill request to covering provider per clinic standard process.    Routing refill request to provider for review/approval because:  Patient needs to be seen because it has been more than 1 year since last office visit.    Last Written Prescription Date:  3/1/23  Last Fill Quantity: 180,  # refills: 0   Last office visit provider:  6/30/21     Requested Prescriptions   Pending Prescriptions Disp Refills     busPIRone (BUSPAR) 7.5 MG tablet 180 tablet 0     Sig: Take 1 tablet (7.5 mg) by mouth 2 times daily       Atypical Antidepressants Protocol Failed - 5/30/2023  9:58 AM        Failed - Recent (12 mo) or future (30 days) visit within the authorizing provider's specialty     Patient has had an office visit with the authorizing provider or a provider within the authorizing providers department within the previous 12 mos or has a future within next 30 days. See \"Patient Info\" tab in inbasket, or \"Choose Columns\" in Meds & Orders section of the refill encounter.              Passed - Medication active on med list        Passed - Patient is age 18 or older        Passed - No active pregnancy on record        Passed - No positive pregnancy test in past 12 mos             Candy Hill RN 05/30/23 6:32 PM      "

## 2023-05-31 NOTE — TELEPHONE ENCOUNTER
5-31-23  LM pt needs an appt  establish care with another provider, Simi Summers NP, Lisa Mendoza NP, Chandrika VALENZUELA .  &  Sent felicity Starkey

## 2023-06-01 RX ORDER — BUSPIRONE HYDROCHLORIDE 7.5 MG/1
7.5 TABLET ORAL 2 TIMES DAILY
Qty: 180 TABLET | Refills: 0 | Status: SHIPPED | OUTPATIENT
Start: 2023-06-01 | End: 2023-08-15

## 2023-06-02 ENCOUNTER — HEALTH MAINTENANCE LETTER (OUTPATIENT)
Age: 38
End: 2023-06-02

## 2023-08-09 ASSESSMENT — ANXIETY QUESTIONNAIRES
4. TROUBLE RELAXING: NOT AT ALL
5. BEING SO RESTLESS THAT IT IS HARD TO SIT STILL: NOT AT ALL
3. WORRYING TOO MUCH ABOUT DIFFERENT THINGS: NOT AT ALL
1. FEELING NERVOUS, ANXIOUS, OR ON EDGE: SEVERAL DAYS
2. NOT BEING ABLE TO STOP OR CONTROL WORRYING: NOT AT ALL
7. FEELING AFRAID AS IF SOMETHING AWFUL MIGHT HAPPEN: NOT AT ALL
GAD7 TOTAL SCORE: 2
6. BECOMING EASILY ANNOYED OR IRRITABLE: SEVERAL DAYS
IF YOU CHECKED OFF ANY PROBLEMS ON THIS QUESTIONNAIRE, HOW DIFFICULT HAVE THESE PROBLEMS MADE IT FOR YOU TO DO YOUR WORK, TAKE CARE OF THINGS AT HOME, OR GET ALONG WITH OTHER PEOPLE: NOT DIFFICULT AT ALL
GAD7 TOTAL SCORE: 2

## 2023-08-15 ENCOUNTER — OFFICE VISIT (OUTPATIENT)
Dept: FAMILY MEDICINE | Facility: CLINIC | Age: 38
End: 2023-08-15
Payer: COMMERCIAL

## 2023-08-15 VITALS
WEIGHT: 135 LBS | BODY MASS INDEX: 23.05 KG/M2 | HEART RATE: 77 BPM | DIASTOLIC BLOOD PRESSURE: 60 MMHG | SYSTOLIC BLOOD PRESSURE: 108 MMHG | HEIGHT: 64 IN | OXYGEN SATURATION: 99 %

## 2023-08-15 DIAGNOSIS — Z13.1 SCREENING FOR DIABETES MELLITUS: ICD-10-CM

## 2023-08-15 DIAGNOSIS — M25.551 HIP PAIN, RIGHT: ICD-10-CM

## 2023-08-15 DIAGNOSIS — F41.9 ANXIETY: ICD-10-CM

## 2023-08-15 DIAGNOSIS — Z82.49 FH: CAD (CORONARY ARTERY DISEASE): ICD-10-CM

## 2023-08-15 DIAGNOSIS — D50.9 IRON DEFICIENCY ANEMIA, UNSPECIFIED IRON DEFICIENCY ANEMIA TYPE: ICD-10-CM

## 2023-08-15 DIAGNOSIS — Z11.59 NEED FOR HEPATITIS C SCREENING TEST: Primary | ICD-10-CM

## 2023-08-15 DIAGNOSIS — N30.00 ACUTE CYSTITIS WITHOUT HEMATURIA: ICD-10-CM

## 2023-08-15 DIAGNOSIS — R11.0 NAUSEA: ICD-10-CM

## 2023-08-15 PROCEDURE — 99214 OFFICE O/P EST MOD 30 MIN: CPT | Performed by: PHYSICIAN ASSISTANT

## 2023-08-15 RX ORDER — NITROFURANTOIN 25; 75 MG/1; MG/1
100 CAPSULE ORAL PRN
Qty: 30 CAPSULE | Refills: 11 | Status: SHIPPED | OUTPATIENT
Start: 2023-08-15 | End: 2024-02-08

## 2023-08-15 RX ORDER — ONDANSETRON 8 MG/1
8 TABLET, ORALLY DISINTEGRATING ORAL EVERY 8 HOURS PRN
Qty: 20 TABLET | Refills: 4 | Status: SHIPPED | OUTPATIENT
Start: 2023-08-15 | End: 2024-02-08

## 2023-08-15 RX ORDER — VALACYCLOVIR HYDROCHLORIDE 1 G/1
1000 TABLET, FILM COATED ORAL DAILY PRN
Qty: 21 TABLET | Refills: 11 | Status: CANCELLED | OUTPATIENT
Start: 2023-08-15

## 2023-08-15 RX ORDER — BUSPIRONE HYDROCHLORIDE 15 MG/1
15 TABLET ORAL 2 TIMES DAILY
Qty: 180 TABLET | Refills: 3 | Status: SHIPPED | OUTPATIENT
Start: 2023-08-15 | End: 2024-02-08

## 2023-08-15 ASSESSMENT — PATIENT HEALTH QUESTIONNAIRE - PHQ9
SUM OF ALL RESPONSES TO PHQ QUESTIONS 1-9: 1
10. IF YOU CHECKED OFF ANY PROBLEMS, HOW DIFFICULT HAVE THESE PROBLEMS MADE IT FOR YOU TO DO YOUR WORK, TAKE CARE OF THINGS AT HOME, OR GET ALONG WITH OTHER PEOPLE: NOT DIFFICULT AT ALL
SUM OF ALL RESPONSES TO PHQ QUESTIONS 1-9: 1

## 2023-08-15 NOTE — PROGRESS NOTES
Assessment & Plan     Need for hepatitis C screening test  - Hepatitis C Screen Reflex to HCV RNA Quant and Genotype    Nausea  Occasional, takes prn zofran  - ondansetron (ZOFRAN ODT) 8 MG ODT tab  Dispense: 20 tablet; Refill: 4    Acute cystitis without hematuria  Takes postcoital preventatively  - nitroFURantoin macrocrystal-monohydrate (MACROBID) 100 MG capsule  Dispense: 30 capsule; Refill: 11    Anxiety  Has been worsening, will increase buspar from 7.5 mg bid to 15 mg bid  - busPIRone (BUSPAR) 15 MG tablet  Dispense: 180 tablet; Refill: 3  - TSH with free T4 reflex  - Basic metabolic panel  (Ca, Cl, CO2, Creat, Gluc, K, Na, BUN)    Iron deficiency anemia, unspecified iron deficiency anemia type  stable  - CBC with platelets    FH: CAD (coronary artery disease)  Multiple family members with ami in their 50s  - Lipid Profile (Chol, Trig, HDL, LDL calc)  - Adult Cardiology Eval  Referral    Screening for diabetes mellitus    - Hemoglobin A1c    Hip pain, right  Began 7 years ago after being pregnant with twins  - Physical Therapy Referral            Chandrika Wheeler PA-C  [facility]    Valencia Mathew is a 37 year old, presenting for the following health issues:  Establish Care        8/15/2023    10:25 AM   Additional Questions   Roomed by Shaniqua   Accompanied by self       History of Present Illness       Mental Health Follow-up:  Patient presents to follow-up on Anxiety.    Patient's anxiety since last visit has been:  Good  The patient is having other symptoms associated with anxiety.  Any significant life events: job concerns and grief or loss  Patient is feeling anxious or having panic attacks.  Patient has no concerns about alcohol or drug use.    She eats 2-3 servings of fruits and vegetables daily.She consumes 0 sweetened beverage(s) daily.She exercises with enough effort to increase her heart rate 9 or less minutes per day.  She exercises with enough effort  "to increase her heart rate 3 or less days per week.   She is taking medications regularly.     Anxiety-pt takes buspar 7.5 mg bid and has been working well for her but notes she has seen an increase in anxiety over the past few weeks cannot identify any other new stressors in her life, she is interested in increasing her buspar dose    Fh of cad- her father has had multiple heart attacks and his mother and siblings had hear attacks in their 50s.  She has never seen a cardiologist but is concerned about the family history    Right hip pain that started after her pregnancy with twins 7 years ago, has never seen physical therapy but is open to see PT    Review of Systems   Constitutional, HEENT, cardiovascular, pulmonary, gi and gu systems are negative, except as otherwise noted.      Objective    /60   Pulse 77   Ht 1.632 m (5' 4.25\")   Wt 61.2 kg (135 lb)   LMP 07/21/2023 (Exact Date)   SpO2 99%   BMI 22.99 kg/m    Body mass index is 22.99 kg/m .  Physical Exam   GENERAL: healthy, alert and no distress  NECK: no adenopathy, no asymmetry, masses, or scars and thyroid normal to palpation  RESP: lungs clear to auscultation - no rales, rhonchi or wheezes  CV: regular rate and rhythm, normal S1 S2, no S3 or S4, no murmur, click or rub, no peripheral edema and peripheral pulses strong  ABDOMEN: soft, nontender, no hepatosplenomegaly, no masses and bowel sounds normal  MS: no gross musculoskeletal defects noted, no edema                      "

## 2023-08-18 ENCOUNTER — LAB (OUTPATIENT)
Dept: LAB | Facility: CLINIC | Age: 38
End: 2023-08-18
Payer: COMMERCIAL

## 2023-08-18 DIAGNOSIS — Z11.59 NEED FOR HEPATITIS C SCREENING TEST: ICD-10-CM

## 2023-08-18 DIAGNOSIS — Z13.1 SCREENING FOR DIABETES MELLITUS: ICD-10-CM

## 2023-08-18 DIAGNOSIS — Z82.49 FH: CAD (CORONARY ARTERY DISEASE): ICD-10-CM

## 2023-08-18 DIAGNOSIS — F41.9 ANXIETY: ICD-10-CM

## 2023-08-18 DIAGNOSIS — D50.9 IRON DEFICIENCY ANEMIA, UNSPECIFIED IRON DEFICIENCY ANEMIA TYPE: ICD-10-CM

## 2023-08-18 LAB
ANION GAP SERPL CALCULATED.3IONS-SCNC: 11 MMOL/L (ref 7–15)
BUN SERPL-MCNC: 17.8 MG/DL (ref 6–20)
CALCIUM SERPL-MCNC: 9.5 MG/DL (ref 8.6–10)
CHLORIDE SERPL-SCNC: 104 MMOL/L (ref 98–107)
CHOLEST SERPL-MCNC: 166 MG/DL
CREAT SERPL-MCNC: 0.84 MG/DL (ref 0.51–0.95)
DEPRECATED HCO3 PLAS-SCNC: 24 MMOL/L (ref 22–29)
ERYTHROCYTE [DISTWIDTH] IN BLOOD BY AUTOMATED COUNT: 13.4 % (ref 10–15)
GFR SERPL CREATININE-BSD FRML MDRD: >90 ML/MIN/1.73M2
GLUCOSE SERPL-MCNC: 88 MG/DL (ref 70–99)
HBA1C MFR BLD: 5.3 % (ref 0–5.6)
HCT VFR BLD AUTO: 39.9 % (ref 35–47)
HDLC SERPL-MCNC: 77 MG/DL
HGB BLD-MCNC: 12.7 G/DL (ref 11.7–15.7)
LDLC SERPL CALC-MCNC: 76 MG/DL
MCH RBC QN AUTO: 26.1 PG (ref 26.5–33)
MCHC RBC AUTO-ENTMCNC: 31.8 G/DL (ref 31.5–36.5)
MCV RBC AUTO: 82 FL (ref 78–100)
NONHDLC SERPL-MCNC: 89 MG/DL
PLATELET # BLD AUTO: 219 10E3/UL (ref 150–450)
POTASSIUM SERPL-SCNC: 4.3 MMOL/L (ref 3.4–5.3)
RBC # BLD AUTO: 4.86 10E6/UL (ref 3.8–5.2)
SODIUM SERPL-SCNC: 139 MMOL/L (ref 136–145)
TRIGL SERPL-MCNC: 63 MG/DL
TSH SERPL DL<=0.005 MIU/L-ACNC: 2.79 UIU/ML (ref 0.3–4.2)
WBC # BLD AUTO: 6.3 10E3/UL (ref 4–11)

## 2023-08-18 PROCEDURE — 83036 HEMOGLOBIN GLYCOSYLATED A1C: CPT

## 2023-08-18 PROCEDURE — 84443 ASSAY THYROID STIM HORMONE: CPT

## 2023-08-18 PROCEDURE — 80061 LIPID PANEL: CPT

## 2023-08-18 PROCEDURE — 36415 COLL VENOUS BLD VENIPUNCTURE: CPT

## 2023-08-18 PROCEDURE — 80048 BASIC METABOLIC PNL TOTAL CA: CPT

## 2023-08-18 PROCEDURE — 85027 COMPLETE CBC AUTOMATED: CPT

## 2023-08-18 PROCEDURE — 86803 HEPATITIS C AB TEST: CPT

## 2023-08-19 LAB — HCV AB SERPL QL IA: NONREACTIVE

## 2023-08-23 ENCOUNTER — MYC MEDICAL ADVICE (OUTPATIENT)
Dept: FAMILY MEDICINE | Facility: CLINIC | Age: 38
End: 2023-08-23
Payer: COMMERCIAL

## 2023-08-23 NOTE — TELEPHONE ENCOUNTER
Sending to PCP for review.  Please review and advise on patient MyChart message.    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    Please advise if another visit needed.    Sophia Costa RN  [facility]

## 2023-08-23 NOTE — LETTER
August 24, 2023      Priyanka Madsen  [address] [address]        To Whom It May Concern:    Priyanka Madsen is a patient of this clinic and I am her primary care provider.  She has a chronic medical condition which I am managing.  She has a support system set up in her home which has allowed her to successfully perform her work duties remotely.    I am aware that her employer is asking for employees to return to the worksite.  At her request I am writing this letter in support of her to continue to work from home.  She will benefit greatly and perform optimally from home given the support system she has in place at her residence.  She reports that her cats have been and continue to be a valuable source of therapy for her.    Please contact me with any questions.        Sincerely,        Chandrika Wheeler PA-C

## 2023-11-30 ENCOUNTER — OFFICE VISIT (OUTPATIENT)
Dept: CARDIOLOGY | Facility: CLINIC | Age: 38
End: 2023-11-30
Attending: PHYSICIAN ASSISTANT
Payer: COMMERCIAL

## 2023-11-30 VITALS
DIASTOLIC BLOOD PRESSURE: 60 MMHG | BODY MASS INDEX: 24.31 KG/M2 | HEIGHT: 64 IN | WEIGHT: 142.4 LBS | RESPIRATION RATE: 20 BRPM | SYSTOLIC BLOOD PRESSURE: 112 MMHG | HEART RATE: 65 BPM | OXYGEN SATURATION: 99 %

## 2023-11-30 DIAGNOSIS — Z00.00 ENCOUNTER FOR SCREENING AND PREVENTATIVE CARE: ICD-10-CM

## 2023-11-30 DIAGNOSIS — R00.2 PALPITATIONS: ICD-10-CM

## 2023-11-30 DIAGNOSIS — Z82.49 FAMILY HISTORY OF PREMATURE CORONARY HEART DISEASE: Primary | ICD-10-CM

## 2023-11-30 PROCEDURE — 99203 OFFICE O/P NEW LOW 30 MIN: CPT | Performed by: INTERNAL MEDICINE

## 2023-11-30 NOTE — LETTER
"11/30/2023    Chandrika Wheeler PA-C  [address] [facility] [place] [address]    RE: Priyanka Madsen       Dear Colleague,     I had the pleasure of seeing Priyanka Madsen in the [facility].    HEART CARE ENCOUNTER CONSULTATON NOTE      [facility]  [phone]      Assessment/Recommendations   Assessment:   Family history of coronary artery disease.  Father with premature coronary disease age 53.  2.  Lipids, normal  3.  BMI 24  4.  Occasional palpitations, mild    Plan:   1.  Recommend coronary calcium scoring to further assess cardiovascular risk.    2.  No indication at this time for statin therapy unless calcium score is significantly elevated  3.  No indication for aspirin  4.  Continue with dietary modifications  5.  Discussed exercise routine  6.  Currently non-smoker  7.  Would consider CRP and lipoprotein a levels if calcium score above 0       History of Present Illness/Subjective    HPI: Priyanka Madsen is a 38 year old female family history of premature coronary disease presents cardiology clinic to further evaluate cardiovascular risk.     Currently from a cardiovascular standpoint the patient is doing well.  She denies any active cardiac symptoms.  She denies any chest pain with exertion, lightheadedness or syncope.  She does note rare palpitations when she feels anxious.  No history of recurrent syncope.    Patient's biological father has significant cardiovascular disease.  He had premature coronary disease at the age of 53 and had multiple myocardial infarctions.  His siblings have had premature disease including the patient's biological aunt.    Recent lipid levels were drawn which were within the normal range.    No prior cardiac surgeries     Physical Examination  Review of Systems   Vitals: /60 (BP Location: Left arm, Patient Position: Sitting, Cuff Size: Adult Regular)   Pulse 65   Resp 20   Ht 1.626 m (5' 4\")   Wt 64.6 kg (142 lb 6.4 oz)   LMP " 2023 (Exact Date)   SpO2 99%   BMI 24.44 kg/m    BMI= Body mass index is 24.44 kg/m .  Wt Readings from Last 3 Encounters:   23 64.6 kg (142 lb 6.4 oz)   08/15/23 61.2 kg (135 lb)   20 62.8 kg (138 lb 7 oz)        Pleasant   ENT/Mouth: membranes moist, no oral lesions or bleeding gums.      EYES:  no scleral icterus, normal conjunctivae       Chest/Lungs:   lungs are clear to auscultation, no rales or wheezing, no sternal scar, equal chest wall expansion    Cardiovascular:   Regular. Normal first and second heart sounds with no murmurs, rubs, or gallops; the carotid, radial and posterior tibial pulses are intact, Jugular venous pressure normal, no edema bilaterally    Abdomen:  no tenderness; bowel sounds are present   Extremities: no cyanosis or clubbing   Skin: no xanthelasma, warm.    Neurologic: normal  bilateral, no tremors     Psychiatric: alert and oriented x3, calm        Please refer above for cardiac ROS details.        Medical History  Surgical History Family History Social History   Past Medical History:   Diagnosis Date    Acne     Anemia, iron deficiency     Anxiety     Blood dyscrasia     IgA deficiency (H)      Past Surgical History:   Procedure Laterality Date     SECTION  2013    Procedure:  SECTION;  Primary  Section  *Latex Allergy*;  Surgeon: Rani Ha MD;  Location: [facility] L+[facility]     SECTION      OTHER SURGICAL HISTORY      plantar warts    TONSILLECTOMY      age 5     Family History   Problem Relation Age of Onset    Thyroid Disease Mother     Cancer Father     Breast Cancer Paternal Grandmother     Thyroid Disease Sister     Thyroid Disease Maternal Aunt     Thyroid Disease Maternal Grandmother         Social History     Socioeconomic History    Marital status:      Spouse name: Not on file    Number of children: Not on file    Years of education: Not on file    Highest education level: Not on file   Occupational  History    Occupation:      Employer: [employer]     Comment: work on [employer]   Tobacco Use    Smoking status: Never    Smokeless tobacco: Never   Vaping Use    Vaping Use: Never used   Substance and Sexual Activity    Alcohol use: Yes     Alcohol/week: 0.0 - 1.7 standard drinks of alcohol    Drug use: Never    Sexual activity: Not on file   Other Topics Concern    Parent/sibling w/ CABG, MI or angioplasty before 65F 55M? Not Asked   Social History Narrative    Lives with her .  Has 9 year old boy and 7 year old boy girl twins.  Works as an  for [employer]     Social [employer]     Financial Resource Strain: Not on file   Food Insecurity: Not on file   Transportation Needs: Not on file   Physical Activity: Not on file   Stress: Not on file   Social Connections: Not on file   Interpersonal Safety: Not on file   Housing Stability: Not on file           Medications  Allergies   Current Outpatient Medications   Medication Sig Dispense Refill    ascorbic acid (VITAMIN C) 500 MG tablet Take 2 tablets by mouth daily.      busPIRone (BUSPAR) 15 MG tablet Take 1 tablet (15 mg) by mouth 2 times daily 180 tablet 3    calcium carbonate-vitamin D (CALCIUM + D) 600-200 MG-UNIT TABS Take 1 tablet by mouth daily.      nitroFURantoin macrocrystal-monohydrate (MACROBID) 100 MG capsule Take 1 capsule (100 mg) by mouth as needed (prn intercourse) 30 capsule 11    ondansetron (ZOFRAN ODT) 8 MG ODT tab Take 1 tablet (8 mg) by mouth every 8 hours as needed for nausea 20 tablet 4    valACYclovir (VALTREX) 1000 mg tablet Take 1 tablet by mouth daily as needed. 21 tablet 11       Allergies   Allergen Reactions    Animal Dander Itching    Dust Mites Itching    Mold Itching          Lab Results    Chemistry/lipid CBC Cardiac Enzymes/BNP/TSH/INR   Recent Labs   Lab Test 08/18/23  0835   CHOL 166   HDL 77   LDL 76   TRIG 63     Recent Labs   Lab Test 08/18/23  0835 04/04/19  0814   LDL 76 75     Recent Labs   Lab Test  "08/18/23  0835      POTASSIUM 4.3   CHLORIDE 104   CO2 24   GLC 88   BUN 17.8   CR 0.84   GFRESTIMATED >90   TOMMY 9.5     Recent Labs   Lab Test 08/18/23  0835 05/05/20  1640 10/01/18  1331   CR 0.84 0.82 0.79     Recent Labs   Lab Test 08/18/23  0835   A1C 5.3          Recent Labs   Lab Test 08/18/23  0835   WBC 6.3   HGB 12.7   HCT 39.9   MCV 82        Recent Labs   Lab Test 08/18/23  0835 05/05/20  1640 10/01/18  1331   HGB 12.7 12.8 12.5    No results for input(s): \"TROPONINI\" in the last 67452 hours.  No results for input(s): \"BNP\", \"NTBNPI\", \"NTBNP\" in the last 94801 hours.  Recent Labs   Lab Test 08/18/23  0835   TSH 2.79     No results for input(s): \"INR\" in the last 69994 hours.     Von Plummer DO                Thank you for allowing me to participate in the care of your patient.      Sincerely,     Von Plummer DO     [facility]  cc:   Chandrika Wheeler PA-C  [address] [place] [address]      "

## 2023-11-30 NOTE — PROGRESS NOTES
"  HEART CARE ENCOUNTER CONSULTATON NOTE      [facility]  [phone]      Assessment/Recommendations   Assessment:   Family history of coronary artery disease.  Father with premature coronary disease age 53.  2.  Lipids, normal  3.  BMI 24  4.  Occasional palpitations, mild    Plan:   1.  Recommend coronary calcium scoring to further assess cardiovascular risk.    2.  No indication at this time for statin therapy unless calcium score is significantly elevated  3.  No indication for aspirin  4.  Continue with dietary modifications  5.  Discussed exercise routine  6.  Currently non-smoker  7.  Would consider CRP and lipoprotein a levels if calcium score above 0       History of Present Illness/Subjective    HPI: Priyanka Madsen is a 38 year old female family history of premature coronary disease presents cardiology clinic to further evaluate cardiovascular risk.     Currently from a cardiovascular standpoint the patient is doing well.  She denies any active cardiac symptoms.  She denies any chest pain with exertion, lightheadedness or syncope.  She does note rare palpitations when she feels anxious.  No history of recurrent syncope.    Patient's biological father has significant cardiovascular disease.  He had premature coronary disease at the age of 53 and had multiple myocardial infarctions.  His siblings have had premature disease including the patient's biological aunt.    Recent lipid levels were drawn which were within the normal range.    No prior cardiac surgeries     Physical Examination  Review of Systems   Vitals: /60 (BP Location: Left arm, Patient Position: Sitting, Cuff Size: Adult Regular)   Pulse 65   Resp 20   Ht 1.626 m (5' 4\")   Wt 64.6 kg (142 lb 6.4 oz)   LMP 11/12/2023 (Exact Date)   SpO2 99%   BMI 24.44 kg/m    BMI= Body mass index is 24.44 kg/m .  Wt Readings from Last 3 Encounters:   11/30/23 64.6 kg (142 lb 6.4 oz)   08/15/23 61.2 kg (135 lb)   05/05/20 62.8 " kg (138 lb 7 oz)        Pleasant   ENT/Mouth: membranes moist, no oral lesions or bleeding gums.      EYES:  no scleral icterus, normal conjunctivae       Chest/Lungs:   lungs are clear to auscultation, no rales or wheezing, no sternal scar, equal chest wall expansion    Cardiovascular:   Regular. Normal first and second heart sounds with no murmurs, rubs, or gallops; the carotid, radial and posterior tibial pulses are intact, Jugular venous pressure normal, no edema bilaterally    Abdomen:  no tenderness; bowel sounds are present   Extremities: no cyanosis or clubbing   Skin: no xanthelasma, warm.    Neurologic: normal  bilateral, no tremors     Psychiatric: alert and oriented x3, calm        Please refer above for cardiac ROS details.        Medical History  Surgical History Family History Social History   Past Medical History:   Diagnosis Date    Acne     Anemia, iron deficiency     Anxiety     Blood dyscrasia     IgA deficiency (H)      Past Surgical History:   Procedure Laterality Date     SECTION  2013    Procedure:  SECTION;  Primary  Section  *Latex Allergy*;  Surgeon: Rani Ha MD;  Location: [facility]     SECTION      OTHER SURGICAL HISTORY      plantar warts    TONSILLECTOMY      age 5     Family History   Problem Relation Age of Onset    Thyroid Disease Mother     Cancer Father     Breast Cancer Paternal Grandmother     Thyroid Disease Sister     Thyroid Disease Maternal Aunt     Thyroid Disease Maternal Grandmother         Social History     Socioeconomic History    Marital status:      Spouse name: Not on file    Number of children: Not on file    Years of education: Not on file    Highest education level: Not on file   Occupational History    Occupation:      Employer: [employer]     Comment: work on [employer]   Tobacco Use    Smoking status: Never    Smokeless tobacco: Never   Vaping Use    Vaping Use: Never used   Substance and  Sexual Activity    Alcohol use: Yes     Alcohol/week: 0.0 - 1.7 standard drinks of alcohol    Drug use: Never    Sexual activity: Not on file   Other Topics Concern    Parent/sibling w/ CABG, MI or angioplasty before 65F 55M? Not Asked   Social History Narrative    Lives with her .  Has 9 year old boy and 7 year old boy girl twins.  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      busPIRone (BUSPAR) 15 MG tablet Take 1 tablet (15 mg) by mouth 2 times daily 180 tablet 3    calcium carbonate-vitamin D (CALCIUM + D) 600-200 MG-UNIT TABS Take 1 tablet by mouth daily.      nitroFURantoin macrocrystal-monohydrate (MACROBID) 100 MG capsule Take 1 capsule (100 mg) by mouth as needed (prn intercourse) 30 capsule 11    ondansetron (ZOFRAN ODT) 8 MG ODT tab Take 1 tablet (8 mg) by mouth every 8 hours as needed for nausea 20 tablet 4    valACYclovir (VALTREX) 1000 mg tablet Take 1 tablet by mouth daily as needed. 21 tablet 11       Allergies   Allergen Reactions    Animal Dander Itching    Dust Mites Itching    Mold Itching          Lab Results    Chemistry/lipid CBC Cardiac Enzymes/BNP/TSH/INR   Recent Labs   Lab Test 08/18/23  0835   CHOL 166   HDL 77   LDL 76   TRIG 63     Recent Labs   Lab Test 08/18/23  0835 04/04/19  0814   LDL 76 75     Recent Labs   Lab Test 08/18/23  0835      POTASSIUM 4.3   CHLORIDE 104   CO2 24   GLC 88   BUN 17.8   CR 0.84   GFRESTIMATED >90   TOMMY 9.5     Recent Labs   Lab Test 08/18/23  0835 05/05/20  1640 10/01/18  1331   CR 0.84  "0.82 0.79     Recent Labs   Lab Test 08/18/23  0835   A1C 5.3          Recent Labs   Lab Test 08/18/23  0835   WBC 6.3   HGB 12.7   HCT 39.9   MCV 82        Recent Labs   Lab Test 08/18/23  0835 05/05/20  1640 10/01/18  1331   HGB 12.7 12.8 12.5    No results for input(s): \"TROPONINI\" in the last 55124 hours.  No results for input(s): \"BNP\", \"NTBNPI\", \"NTBNP\" in the last 26875 hours.  Recent Labs   Lab Test 08/18/23  0835   TSH 2.79     No results for input(s): \"INR\" in the last 84294 hours.     Von Plummer, DO                                     "

## 2023-12-22 ENCOUNTER — HOSPITAL ENCOUNTER (OUTPATIENT)
Dept: CT IMAGING | Facility: CLINIC | Age: 38
Discharge: HOME | End: 2023-12-22
Attending: INTERNAL MEDICINE | Admitting: INTERNAL MEDICINE
Payer: COMMERCIAL

## 2023-12-22 DIAGNOSIS — Z82.49 FAMILY HISTORY OF PREMATURE CORONARY HEART DISEASE: ICD-10-CM

## 2023-12-22 LAB
CV CALCIUM SCORE AGATSTON LM: 0
CV CALCIUM SCORING AGATSON LAD: 0
CV CALCIUM SCORING AGATSTON CX: 0
CV CALCIUM SCORING AGATSTON RCA: 0
CV CALCIUM SCORING AGATSTON TOTAL: 0

## 2023-12-22 PROCEDURE — 75571 CT HRT W/O DYE W/CA TEST: CPT | Mod: 26 | Performed by: INTERNAL MEDICINE

## 2023-12-22 PROCEDURE — 75571 CT HRT W/O DYE W/CA TEST: CPT

## 2023-12-26 DIAGNOSIS — R91.1 INCIDENTAL LUNG NODULE, GREATER THAN OR EQUAL TO 8MM: Primary | ICD-10-CM

## 2023-12-26 NOTE — RESULT ENCOUNTER NOTE
Please inform patient that calcium score was zero. There was a lung nodule seen which patient should be referred to nodule clinic to follow as they will repeat CT in 3-6 months and then likely in 18 months. Please refer to nodule clinic.

## 2023-12-26 NOTE — PROGRESS NOTES
Pulm med referral order placed.  -iris Plummer,   12/26/2023  1:29 PM CST       Please inform patient that calcium score was zero. There was a lung nodule seen which patient should be referred to nodule clinic to follow as they will repeat CT in 3-6 months and then likely in 18 months. Please refer to nodule clinic.

## 2023-12-27 ENCOUNTER — PATIENT OUTREACH (OUTPATIENT)
Dept: ONCOLOGY | Facility: CLINIC | Age: 38
End: 2023-12-27
Payer: COMMERCIAL

## 2023-12-27 DIAGNOSIS — R91.8 PULMONARY NODULES: Primary | ICD-10-CM

## 2023-12-27 NOTE — PROGRESS NOTES
New IP (Interventional Pulmonology) referral rec'd.  Chart reviewed.       New Patient: Interventional Pulmonary (Lung nodule) Nurse Navigator Note    Referring provider: Von Plummer, [facility]    Referred to (specialty): Interventional Pulmonary (Lung nodule)    Requested provider (if applicable): n/a    Date Referral Received: 12/26/2023    Evaluation for :  Lung nodule-8 mm noncalcified nodule in the left lower lobe.    Clinical History (per Nurse review of records provided):    **BOOK MARKED**    OVERREAD: DETAILED [place] [facility] EXTRACARDIAC OVERREAD OF CARDIAC CT   LOCATION: [facility]  DATE: 12/22/2023     INDICATION:  Family history of premature coronary heart disease  TECHNIQUE: Dose reduction techniques were used.  COMPARISON: None.     FINDINGS:    LIMITED CHEST: 8 mm noncalcified nodule within the left lower lobe. No comparison studies available.  LIMITED MEDIASTINUM: Negative.  LIMITED UPPER ABDOMEN: Negative.                                                                      IMPRESSION:    1.  8 mm noncalcified nodule in the left lower lobe. No comparison studies are available.  2.  Please refer to cardiologist's dictation for the cardiac CT report.  Records Location: [facility]     Records Needed: none    Additional testing needed prior to consult: CT Chest

## 2024-01-05 ENCOUNTER — VIRTUAL VISIT (OUTPATIENT)
Dept: URGENT CARE | Facility: CLINIC | Age: 39
End: 2024-01-05
Payer: COMMERCIAL

## 2024-01-05 ENCOUNTER — OFFICE VISIT (OUTPATIENT)
Dept: FAMILY MEDICINE | Facility: CLINIC | Age: 39
End: 2024-01-05
Payer: COMMERCIAL

## 2024-01-05 VITALS
SYSTOLIC BLOOD PRESSURE: 125 MMHG | HEART RATE: 76 BPM | TEMPERATURE: 97.5 F | OXYGEN SATURATION: 97 % | DIASTOLIC BLOOD PRESSURE: 82 MMHG | RESPIRATION RATE: 20 BRPM

## 2024-01-05 DIAGNOSIS — R42 LIGHT HEADED: ICD-10-CM

## 2024-01-05 DIAGNOSIS — R05.1 ACUTE COUGH: Primary | ICD-10-CM

## 2024-01-05 DIAGNOSIS — J06.9 VIRAL URI: ICD-10-CM

## 2024-01-05 DIAGNOSIS — R09.81 NASAL CONGESTION: ICD-10-CM

## 2024-01-05 LAB
FLUAV AG SPEC QL IA: NEGATIVE
FLUBV AG SPEC QL IA: NEGATIVE
RSV AG SPEC QL: NEGATIVE

## 2024-01-05 PROCEDURE — 87807 RSV ASSAY W/OPTIC: CPT | Performed by: STUDENT IN AN ORGANIZED HEALTH CARE EDUCATION/TRAINING PROGRAM

## 2024-01-05 PROCEDURE — 87635 SARS-COV-2 COVID-19 AMP PRB: CPT | Performed by: STUDENT IN AN ORGANIZED HEALTH CARE EDUCATION/TRAINING PROGRAM

## 2024-01-05 PROCEDURE — 99214 OFFICE O/P EST MOD 30 MIN: CPT | Performed by: STUDENT IN AN ORGANIZED HEALTH CARE EDUCATION/TRAINING PROGRAM

## 2024-01-05 PROCEDURE — 87804 INFLUENZA ASSAY W/OPTIC: CPT | Performed by: STUDENT IN AN ORGANIZED HEALTH CARE EDUCATION/TRAINING PROGRAM

## 2024-01-05 PROCEDURE — 99207 PR NO CHARGE LOS: CPT | Mod: 95

## 2024-01-05 RX ORDER — ECHINACEA PURPUREA EXTRACT 125 MG
TABLET ORAL
Qty: 30 ML | Refills: 0 | Status: SHIPPED | OUTPATIENT
Start: 2024-01-05

## 2024-01-05 RX ORDER — FLUTICASONE PROPIONATE 50 MCG
1 SPRAY, SUSPENSION (ML) NASAL 2 TIMES DAILY
Qty: 9.9 ML | Refills: 0 | Status: SHIPPED | OUTPATIENT
Start: 2024-01-05

## 2024-01-05 RX ORDER — GUAIFENESIN AND DEXTROMETHORPHAN HYDROBROMIDE 600; 30 MG/1; MG/1
1 TABLET, EXTENDED RELEASE ORAL EVERY 12 HOURS
COMMUNITY

## 2024-01-05 RX ORDER — OXYMETAZOLINE HYDROCHLORIDE 0.05 G/100ML
2 SPRAY NASAL 2 TIMES DAILY
Qty: 2 ML | Refills: 0 | Status: SHIPPED | OUTPATIENT
Start: 2024-01-05 | End: 2024-01-08

## 2024-01-05 NOTE — PROGRESS NOTES
Priyanka presents via video for evaluation of URI symptoms.  She states that her whole family had a chest cold that began right before [place], about 2 weeks ago.  She states that she has had postnasal drainage and a cough.  Her cough feels like it is coming from deeper in her chest versus just a tickle in her throat the cough is dry and nonproductive.  She also feels very lightheaded when her head is in the clouds.  Her sinuses feel clear and she is not sneezing and not blowing her nose.    I recommended she go into the clinic to be seen in person for further evaluation.  We discussed that an lung exam and vitals will help to determine the next best step.  We discussed that symptoms may be viral and just need to clear.  In that case she could potentially try Tessalon Perles or an albuterol inhaler if clinically necessary.  May or may not choose to do a chest x-ray based on lung exam. She will go to urgent care today.    Frida Leal PA-C  [facility]

## 2024-01-05 NOTE — PATIENT INSTRUCTIONS
For sinus pressure and congestion, use nasal sprays as below:    1) Afrin twice a day for 3 days (do not continue past 3 days as it can make congestion worse after that)  2) Flonase twice a day for 14 days or until symptoms completely gone  3) Nasal saline spray 2-4 times a day for 14 days or until symptoms completely gone.    Also use tylenol and ibuprofen as needed for pain.    If your symptoms do not improve within 7-10 days, cough is getting worse, you develop fevers, or your shortness of breath worsens, please come back for a chest x-ray to assess for pneumonia.     Drink 50-64 fl oz/day (water, Pedialyte).

## 2024-01-05 NOTE — PROGRESS NOTES
Assessment & Plan     1. Acute cough  2. Viral URI  Low suspicion for pneumonia at this time. Patient has been afebrile, lung exam clear. Suspect cough is from significant PND noted on exam. Do suspect lightheadedness and some shortness of breath are from this viral illness in setting of known lower blood pressures and some dehydration. No chest pain/palpitations, headaches, LOC, or other concerning features at this time.   - Respiratory Syncytial Virus (RSV) Antigen  - Symptomatic COVID-19 Virus (Coronavirus) by PCR Nose  - Influenza A & B Antigen (Clinic collect)  - Discussed supportive cares and return precautions; also in AVS  - Increase fluid intake    3. Nasal congestion  No e/o sinusitis. Will hold on antibiotics at this time.   - Respiratory Syncytial Virus (RSV) Antigen  - Symptomatic COVID-19 Virus (Coronavirus) by PCR Nose  - Influenza A & B Antigen (Clinic collect)  - oxymetazoline (AFRIN) 0.05 % nasal spray; Spray 0.2 mLs (2 sprays) into both nostrils 2 times daily for 3 days  Dispense: 2 mL; Refill: 0  - fluticasone (FLONASE) 50 MCG/ACT nasal spray; Spray 1 spray into both nostrils 2 times daily  Dispense: 9.9 mL; Refill: 0  - sodium chloride (OCEAN) 0.65 % nasal spray; 2 sprays in each nostril 2-4x/day.  Dispense: 30 mL; Refill: 0           Follow up with primary care provider with any problems, questions or concerns or if symptoms worsen or fail to improve. Patient agreed to plan and verbalized understanding.     Valencia Mathew is a 38 year old female who presents to clinic today for the following health issues:  Chief Complaint   Patient presents with    Cough     2 weeks   Light headed    Nasal Congestion     [place] zenaida/gurvinder day --> cough, congestion, sore throat, no fevers. Family has also been sick. Son and  have both been given antibiotics for sinusitis. Priyanka has tried Mucinex and Pseudofed. Did a virtual visit, got a cough suppressant. Non-productive but can feel it in  her lungs. Was told to come in. Since last night has felt lightheaded, cloudy, feels head spinning. Not dizzy. Worse with activity/shortness of breath/getting up quickly. No nausea, vomiting, abdominal pain. Some diarrhea. Taste comes and goes. Drinking and eating well. COVID negative. Got COVID and flu vaccines.     Was getting heart looked at last month. Did see a lung nodule on CT scan. Heart was okay. Seeing a pulmonologist in February.       ROS negative unless noted in HPI.    Problem List:  2023-08: Anemia, iron deficiency  2023-08: Anxiety  2013-12: Pre-op evaluation  2013-12: IUGR (intrauterine growth restriction)  2013-06: IgA deficiency (H)  2013-06: Pregnancy, first  2012-11: AD (atopic dermatitis)  2012-11: Wart  2012-10: Acne      Past Medical History:   Diagnosis Date    Acne     Anemia, iron deficiency     Anxiety     Blood dyscrasia     IgA deficiency (H)        Social History     Tobacco Use    Smoking status: Never    Smokeless tobacco: Never   Substance Use Topics    Alcohol use: Yes     Alcohol/week: 0.0 - 1.7 standard drinks of alcohol           Objective    /82   Pulse 76   Temp 97.5  F (36.4  C)   Resp 20   LMP 11/12/2023 (Exact Date)   SpO2 97%   Physical Exam  Constitutional:       Appearance: Normal appearance.   HENT:      Head: Normocephalic and atraumatic.      Ears:      Comments: Effusion bilateral TM     Nose: No congestion or rhinorrhea.      Comments: No sinus tenderness  Eyes:      Pupils: Pupils are equal, round, and reactive to light.      Comments: Conjunctival injection noted   Cardiovascular:      Rate and Rhythm: Normal rate and regular rhythm.      Pulses: Normal pulses.      Heart sounds: Normal heart sounds.   Pulmonary:      Effort: Pulmonary effort is normal. No respiratory distress.      Breath sounds: Normal breath sounds. No wheezing, rhonchi or rales.   Musculoskeletal:         General: Normal range of motion.      Cervical back: Normal range of motion.  No rigidity or tenderness.   Lymphadenopathy:      Cervical: No cervical adenopathy.   Skin:     General: Skin is warm and dry.   Neurological:      General: No focal deficit present.      Mental Status: She is alert and oriented to person, place, and time.   Psychiatric:         Mood and Affect: Mood normal.         Behavior: Behavior normal.          Hayley Severson, MD-MPH

## 2024-01-06 LAB — SARS-COV-2 RNA RESP QL NAA+PROBE: NEGATIVE

## 2024-01-26 NOTE — TELEPHONE ENCOUNTER
RECORDS STATUS - ALL OTHER DIAGNOSIS      RECORDS RECEIVED FROM:    Appt Date: 2/8/2024     - Incidental lung nodule, greater than or equal to 8mm   Imaging Received  January 30, 2024  5:20 PM  ELLIOTT   Action: Confirmed images from [facility] received and resolved to PACS.     Action    Action Taken 1/26/2024 3:03pm ALEX     I pulled HE images into PACS    I faxed over a request for imaging to        NOTES STATUS DETAILS   OFFICE NOTE from referring provider Complete    Von Plummer DO      OFFICE NOTE from medical oncologist     DISCHARGE SUMMARY from hospital     DISCHARGE REPORT from the ER     OPERATIVE REPORT     MEDICATION LIST Complete [facility]   CLINICAL TRIAL TREATMENTS TO DATE     LABS     PATHOLOGY REPORTS     ANYTHING RELATED TO DIAGNOSIS Complete In EPIC    GENONOMIC TESTING     TYPE:     IMAGING (NEED IMAGES & REPORT)     CT SCANS Complete CT Chest 2/8/2024   Xray Chest Requested-   Xray Chest 10/2018   MAMMO     ULTRASOUND     PET

## 2024-02-08 ENCOUNTER — ONCOLOGY VISIT (OUTPATIENT)
Dept: PULMONOLOGY | Facility: CLINIC | Age: 39
End: 2024-02-08
Attending: INTERNAL MEDICINE
Payer: COMMERCIAL

## 2024-02-08 ENCOUNTER — HOSPITAL ENCOUNTER (OUTPATIENT)
Dept: CT IMAGING | Facility: HOSPITAL | Age: 39
Discharge: HOME | End: 2024-02-08
Attending: INTERNAL MEDICINE | Admitting: INTERNAL MEDICINE
Payer: COMMERCIAL

## 2024-02-08 ENCOUNTER — PRE VISIT (OUTPATIENT)
Dept: PULMONOLOGY | Facility: CLINIC | Age: 39
End: 2024-02-08

## 2024-02-08 VITALS — HEART RATE: 75 BPM | RESPIRATION RATE: 18 BRPM

## 2024-02-08 DIAGNOSIS — R91.1 INCIDENTAL LUNG NODULE, GREATER THAN OR EQUAL TO 8MM: ICD-10-CM

## 2024-02-08 DIAGNOSIS — R91.8 PULMONARY NODULES: ICD-10-CM

## 2024-02-08 PROCEDURE — 99204 OFFICE O/P NEW MOD 45 MIN: CPT | Performed by: INTERNAL MEDICINE

## 2024-02-08 PROCEDURE — 71250 CT THORAX DX C-: CPT

## 2024-02-08 RX ORDER — BUSPIRONE HYDROCHLORIDE 15 MG/1
15 TABLET ORAL 2 TIMES DAILY
COMMUNITY
Start: 2024-02-08 | End: 2024-05-14

## 2024-02-08 RX ORDER — VALACYCLOVIR HYDROCHLORIDE 1 G/1
1000 TABLET, FILM COATED ORAL DAILY PRN
COMMUNITY
Start: 2024-02-08

## 2024-02-08 RX ORDER — NITROFURANTOIN 25; 75 MG/1; MG/1
100 CAPSULE ORAL PRN
COMMUNITY
Start: 2024-02-08 | End: 2024-05-22

## 2024-02-08 NOTE — PROGRESS NOTES
LUNG NODULE & INTERVENTIONAL PULMONARY CLINIC  [facility]     Priyanka Madsen MRN# [id number]   Age: 38 year old YOB: 1985       Requesting Physician: Von Plummer DO  [address] [place] [address]       Assessment and Plan:    1. New solitary pulmonary lung nodule(s). Given the characteristics on current/previous imaging and risk factors; I would classify this to be Low (<6%) risk for cancer.  CT scan in 6 months.  If it is stable repeat at 18 months and then graduate from screening.             History:     Priyanka Madsen is a 38 year old female with sig h/o for family history of early cardiovascular disease who is here for evaluation/followup of lung nodule(s).  This was noted incidentally during a cardiac CT.  She has no pulmonary symptoms.    Works as an  [employer].  Primarily works from home.  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  No other concerning exposures.    - My interpretation of the images relevant for this visit includes: LLL nodule stable vs. Cardiac CT.            Past Medical History:      Past Medical History:   Diagnosis Date    Acne     Anemia, iron deficiency     Anxiety     Blood dyscrasia     IgA deficiency (H)            Past Surgical History:      Past Surgical History:   Procedure Laterality Date     SECTION  2013    Procedure:  SECTION;  Primary  Section  *Latex Allergy*;  Surgeon: Rani Ha MD;  [facility]     SECTION      OTHER SURGICAL HISTORY      plantar warts    TONSILLECTOMY      age 5          Social History:     Social History     Tobacco Use    Smoking status: Never    Smokeless tobacco: Never   Substance Use Topics    Alcohol use: Yes     Alcohol/week: 0.0 - 1.7 standard drinks of alcohol          Family History:     Family History   Problem Relation Age of Onset    Thyroid  Disease Mother     Cancer Father     Breast Cancer Paternal Grandmother     Thyroid Disease Sister     Thyroid Disease Maternal Aunt     Thyroid Disease Maternal Grandmother            Allergies:      Allergies   Allergen Reactions    Animal Dander Itching    Dust Mites Itching    Mold Itching          Medications:     Current Outpatient Medications   Medication Sig    dextromethorphan-guaiFENesin (MUCINEX DM)  MG 12 hr tablet Take 1 tablet by mouth every 12 hours    fluticasone (FLONASE) 50 MCG/ACT nasal spray Spray 1 spray into both nostrils 2 times daily    sodium chloride (OCEAN) 0.65 % nasal spray 2 sprays in each nostril 2-4x/day.     No current facility-administered medications for this visit.          Review of Systems:     See HPI         Physical Exam:   There were no vitals taken for this visit.    Constitutional - looks well, in no apparent distress  Eyes - no redness or discharge  Respiratory -breathing appears comfortable. No evident wheezing or stridor  Skin - No appreciable discoloration or lesions (very limited exam)  Neurological - No apparent tremors. Speech fluent and articlate  Psychiatric - no signs of delirium or anxiety          Current Laboratory Data:   All laboratory and imaging data reviewed.    Results for orders placed or performed during the hospital encounter of 02/08/24 (from the past 24 hour(s))   CT Chest w/o Contrast    Narrative    EXAM: CT CHEST W/O CONTRAST  LOCATION: [facility]  DATE: 2/8/2024    INDICATION: lung nodule noted on CT calcium screen  COMPARISON: Limited cardiac CT dated 12/22/2023  TECHNIQUE: CT chest without IV contrast. Multiplanar reformats were obtained. Dose reduction techniques were used.  CONTRAST: None.    FINDINGS:   LUNGS AND PLEURA: Patent central airways. Tiny medial right lower lobe calcified adenoma. Noncalcified solid nodule measuring 0.7 x 0.8 cm in the left lower lobe is unchanged (series 3, image 139). No  additional noncalcified solid pulmonary nodules   identified. No acute appearing airspace opacities to suggest pneumonia. No pleural effusions. No pneumothorax.    MEDIASTINUM/AXILLAE: No intrathoracic lymphadenopathy is identified by size criteria. Normal caliber thoracic aorta. Normal heart size. No pericardial effusion. Normal esophagus.    CORONARY ARTERY CALCIFICATION: None.    UPPER ABDOMEN: No significant finding.    MUSCULOSKELETAL: No aggressive osseous lesion.      Impression    IMPRESSION:   1.  Unchanged 0.7 x 0.8 cm left lower lobe nodule. See below for follow-up recommendations.    REFERENCE:  Guidelines for Management of Incidental Pulmonary Nodules Detected on CT Images: From the Fleischner Society 2017.   Guidelines apply to incidental nodules in patients who are 35 years or older.  Guidelines do not apply to lung cancer screening, patients with immunosuppression, or patients with known primary cancer.    SINGLE NODULE  Nodule size 6-8 mm  Low-risk patients: Follow-up CT at 6-12 months, then consider CT at 18-24 months.  High-risk patients: Follow-up CT at 6-12 months, then at 18-24 months if no change.

## 2024-02-23 ENCOUNTER — LAB REQUISITION (OUTPATIENT)
Dept: LAB | Facility: CLINIC | Age: 39
End: 2024-02-23

## 2024-02-23 ENCOUNTER — HOSPITAL ENCOUNTER (OUTPATIENT)
Facility: CLINIC | Age: 39
Discharge: HOME | End: 2024-02-23
Admitting: STUDENT IN AN ORGANIZED HEALTH CARE EDUCATION/TRAINING PROGRAM
Payer: COMMERCIAL

## 2024-02-23 DIAGNOSIS — R10.2 PELVIC AND PERINEAL PAIN: ICD-10-CM

## 2024-02-23 DIAGNOSIS — Z11.3 ENCOUNTER FOR SCREENING FOR INFECTIONS WITH A PREDOMINANTLY SEXUAL MODE OF TRANSMISSION: ICD-10-CM

## 2024-02-23 LAB — HIV 1+2 AB+HIV1 P24 AG SERPL QL IA: NONREACTIVE

## 2024-02-23 PROCEDURE — 87106 FUNGI IDENTIFICATION YEAST: CPT | Performed by: STUDENT IN AN ORGANIZED HEALTH CARE EDUCATION/TRAINING PROGRAM

## 2024-02-23 PROCEDURE — 87491 CHLMYD TRACH DNA AMP PROBE: CPT | Performed by: STUDENT IN AN ORGANIZED HEALTH CARE EDUCATION/TRAINING PROGRAM

## 2024-02-23 PROCEDURE — 87389 HIV-1 AG W/HIV-1&-2 AB AG IA: CPT | Performed by: STUDENT IN AN ORGANIZED HEALTH CARE EDUCATION/TRAINING PROGRAM

## 2024-02-23 PROCEDURE — 87340 HEPATITIS B SURFACE AG IA: CPT | Performed by: STUDENT IN AN ORGANIZED HEALTH CARE EDUCATION/TRAINING PROGRAM

## 2024-02-23 PROCEDURE — 86780 TREPONEMA PALLIDUM: CPT | Performed by: STUDENT IN AN ORGANIZED HEALTH CARE EDUCATION/TRAINING PROGRAM

## 2024-02-23 PROCEDURE — 86803 HEPATITIS C AB TEST: CPT | Performed by: STUDENT IN AN ORGANIZED HEALTH CARE EDUCATION/TRAINING PROGRAM

## 2024-02-24 LAB
C TRACH DNA SPEC QL PROBE+SIG AMP: NEGATIVE
HBV SURFACE AG SERPL QL IA: NONREACTIVE
HCV AB SERPL QL IA: NONREACTIVE
N GONORRHOEA DNA SPEC QL NAA+PROBE: NEGATIVE
T PALLIDUM AB SER QL: NONREACTIVE

## 2024-02-27 LAB — BACTERIA SPEC CULT: ABNORMAL

## 2024-05-14 ENCOUNTER — MYC REFILL (OUTPATIENT)
Dept: FAMILY MEDICINE | Facility: CLINIC | Age: 39
End: 2024-05-14
Payer: COMMERCIAL

## 2024-05-14 DIAGNOSIS — F41.9 ANXIETY: Primary | ICD-10-CM

## 2024-05-16 DIAGNOSIS — F41.9 ANXIETY: ICD-10-CM

## 2024-05-16 RX ORDER — BUSPIRONE HYDROCHLORIDE 15 MG/1
15 TABLET ORAL 2 TIMES DAILY
Qty: 180 TABLET | Refills: 0 | OUTPATIENT
Start: 2024-05-16

## 2024-05-16 RX ORDER — BUSPIRONE HYDROCHLORIDE 15 MG/1
15 TABLET ORAL 2 TIMES DAILY
Qty: 180 TABLET | Refills: 0 | Status: SHIPPED | OUTPATIENT
Start: 2024-05-16 | End: 2024-08-19

## 2024-05-16 NOTE — TELEPHONE ENCOUNTER
Medication Question or Refill        What medication are you calling about (include dose and sig)?:   busPIRone (BUSPAR) 15 MG tablet    Preferred Pharmacy:    [facility] [id number] - [place], [place] - [address]  [address] [facility] [address]  Phone: [phone] Fax: [phone]      Controlled Substance Agreement on file:   CSA -- Patient Level:    CSA: None found at the patient level.       Who prescribed the medication?: PADMAJA Wheeler PA-C    Do you need a refill? Yes    When did you use the medication last?   Every day twice a day- will be out in 5 days    Patient offered an appointment? No    Do you have any questions or concerns?  No      Could we send this information to you in [place] or would you prefer to receive a phone call?:   Patient would prefer a phone call   Okay to leave a detailed message?: Yes at Cell number on file:    Telephone Information:   Mobile [phone]

## 2024-05-21 ENCOUNTER — MYC REFILL (OUTPATIENT)
Dept: FAMILY MEDICINE | Facility: CLINIC | Age: 39
End: 2024-05-21
Payer: COMMERCIAL

## 2024-05-21 DIAGNOSIS — N39.0 RECURRENT UTI: Primary | ICD-10-CM

## 2024-05-22 RX ORDER — NITROFURANTOIN 25; 75 MG/1; MG/1
100 CAPSULE ORAL PRN
Qty: 60 CAPSULE | Refills: 1 | Status: SHIPPED | OUTPATIENT
Start: 2024-05-22

## 2024-05-22 RX ORDER — NITROFURANTOIN 25; 75 MG/1; MG/1
100 CAPSULE ORAL PRN
OUTPATIENT
Start: 2024-05-22

## 2024-06-29 ENCOUNTER — HEALTH MAINTENANCE LETTER (OUTPATIENT)
Age: 39
End: 2024-06-29

## 2024-08-18 DIAGNOSIS — F41.9 ANXIETY: ICD-10-CM

## 2024-08-19 ENCOUNTER — MYC REFILL (OUTPATIENT)
Dept: FAMILY MEDICINE | Facility: CLINIC | Age: 39
End: 2024-08-19
Payer: COMMERCIAL

## 2024-08-19 DIAGNOSIS — F41.9 ANXIETY: ICD-10-CM

## 2024-08-19 RX ORDER — BUSPIRONE HYDROCHLORIDE 15 MG/1
15 TABLET ORAL 2 TIMES DAILY
Qty: 180 TABLET | Refills: 0 | OUTPATIENT
Start: 2024-08-19

## 2024-08-19 RX ORDER — BUSPIRONE HYDROCHLORIDE 15 MG/1
15 TABLET ORAL 2 TIMES DAILY
Qty: 180 TABLET | Refills: 0 | Status: SHIPPED | OUTPATIENT
Start: 2024-08-19 | End: 2024-08-22

## 2024-08-19 NOTE — TELEPHONE ENCOUNTER
Pt due for follow up.  Please contact to schedule appt  ok for virtual, ok to use approval required.    Chandrika Wheeler PA-C

## 2024-08-20 RX ORDER — BUSPIRONE HYDROCHLORIDE 15 MG/1
15 TABLET ORAL 2 TIMES DAILY
Qty: 180 TABLET | Refills: 3 | Status: SHIPPED | OUTPATIENT
Start: 2024-08-20 | End: 2024-08-22

## 2024-08-20 SDOH — HEALTH STABILITY: PHYSICAL HEALTH: ON AVERAGE, HOW MANY DAYS PER WEEK DO YOU ENGAGE IN MODERATE TO STRENUOUS EXERCISE (LIKE A BRISK WALK)?: 2 DAYS

## 2024-08-20 ASSESSMENT — SOCIAL DETERMINANTS OF HEALTH (SDOH): HOW OFTEN DO YOU GET TOGETHER WITH FRIENDS OR RELATIVES?: TWICE A WEEK

## 2024-08-21 PROBLEM — F41.1 GAD (GENERALIZED ANXIETY DISORDER): Status: ACTIVE | Noted: 2022-05-19

## 2024-08-21 PROBLEM — A49.9 RECURRENT BACTERIAL INFECTION: Status: ACTIVE | Noted: 2024-08-21

## 2024-08-21 PROBLEM — F33.0 MILD RECURRENT MAJOR DEPRESSION (H): Status: ACTIVE | Noted: 2018-11-06

## 2024-08-22 ENCOUNTER — OFFICE VISIT (OUTPATIENT)
Dept: FAMILY MEDICINE | Facility: CLINIC | Age: 39
End: 2024-08-22
Payer: COMMERCIAL

## 2024-08-22 VITALS
HEIGHT: 65 IN | RESPIRATION RATE: 16 BRPM | DIASTOLIC BLOOD PRESSURE: 64 MMHG | SYSTOLIC BLOOD PRESSURE: 122 MMHG | TEMPERATURE: 96.9 F | BODY MASS INDEX: 23.61 KG/M2 | OXYGEN SATURATION: 99 % | WEIGHT: 141.7 LBS | HEART RATE: 74 BPM

## 2024-08-22 DIAGNOSIS — Z13.1 SCREENING FOR DIABETES MELLITUS: ICD-10-CM

## 2024-08-22 DIAGNOSIS — Z13.220 LIPID SCREENING: ICD-10-CM

## 2024-08-22 DIAGNOSIS — Z13.29 SCREENING FOR THYROID DISORDER: ICD-10-CM

## 2024-08-22 DIAGNOSIS — Z12.31 ENCOUNTER FOR SCREENING MAMMOGRAM FOR BREAST CANCER: ICD-10-CM

## 2024-08-22 DIAGNOSIS — Z00.00 ROUTINE GENERAL MEDICAL EXAMINATION AT A HEALTH CARE FACILITY: Primary | ICD-10-CM

## 2024-08-22 DIAGNOSIS — F41.9 ANXIETY: ICD-10-CM

## 2024-08-22 DIAGNOSIS — F33.0 MILD RECURRENT MAJOR DEPRESSION (H): ICD-10-CM

## 2024-08-22 PROCEDURE — 99395 PREV VISIT EST AGE 18-39: CPT | Performed by: PHYSICIAN ASSISTANT

## 2024-08-22 RX ORDER — BUSPIRONE HYDROCHLORIDE 15 MG/1
15 TABLET ORAL 2 TIMES DAILY
Qty: 180 TABLET | Refills: 3 | Status: SHIPPED | OUTPATIENT
Start: 2024-08-22

## 2024-08-22 ASSESSMENT — ANXIETY QUESTIONNAIRES
4. TROUBLE RELAXING: NOT AT ALL
7. FEELING AFRAID AS IF SOMETHING AWFUL MIGHT HAPPEN: NOT AT ALL
GAD7 TOTAL SCORE: 1
7. FEELING AFRAID AS IF SOMETHING AWFUL MIGHT HAPPEN: NOT AT ALL
1. FEELING NERVOUS, ANXIOUS, OR ON EDGE: SEVERAL DAYS
5. BEING SO RESTLESS THAT IT IS HARD TO SIT STILL: NOT AT ALL
IF YOU CHECKED OFF ANY PROBLEMS ON THIS QUESTIONNAIRE, HOW DIFFICULT HAVE THESE PROBLEMS MADE IT FOR YOU TO DO YOUR WORK, TAKE CARE OF THINGS AT HOME, OR GET ALONG WITH OTHER PEOPLE: NOT DIFFICULT AT ALL
GAD7 TOTAL SCORE: 1
2. NOT BEING ABLE TO STOP OR CONTROL WORRYING: NOT AT ALL
GAD7 TOTAL SCORE: 1
8. IF YOU CHECKED OFF ANY PROBLEMS, HOW DIFFICULT HAVE THESE MADE IT FOR YOU TO DO YOUR WORK, TAKE CARE OF THINGS AT HOME, OR GET ALONG WITH OTHER PEOPLE?: NOT DIFFICULT AT ALL
6. BECOMING EASILY ANNOYED OR IRRITABLE: NOT AT ALL
3. WORRYING TOO MUCH ABOUT DIFFERENT THINGS: NOT AT ALL

## 2024-08-22 NOTE — PROGRESS NOTES
Preventive Care Visit  [facility] SHANTI Wheeler PA-C, Family Medicine  Aug 22, 2024      Assessment & Plan     Routine general medical examination at a health care facility  - Basic metabolic panel  (Ca, Cl, CO2, Creat, Gluc, K, Na, BUN)  - CBC with platelets    Lipid screening  - Lipid Profile (Chol, Trig, HDL, LDL calc)    Encounter for screening mammogram for breast cancer  - MA Screen Bilateral w/Darci    Screening for thyroid disorder  - TSH with free T4 reflex    Screening for diabetes mellitus  - Hemoglobin A1c    Mild recurrent major depression (H24)  Well controlled      Counseling  Appropriate preventive services were addressed with this patient via screening, questionnaire, or discussion as appropriate for fall prevention, nutrition, physical activity, Tobacco-use cessation, social engagement, weight loss and cognition.  Checklist reviewing preventive services available has been given to the patient.  Reviewed patient's diet, addressing concerns and/or questions.   She is at risk for lack of exercise and has been provided with information to increase physical activity for the benefit of her well-being.   The patient reports drinking more than 3 alcoholic drinks per day and/or more than 7 drhnks per week. The patient was counseled and given information about possible harmful effects of excessive alcohol intake.        Valencia Mathew is a 38 year old, presenting for the following:  Physical (Mammo 7/26/19 Pap 3/31/21 )        8/22/2024     2:03 PM   Additional Questions   Roomed by Maci        Health Care Directive  Patient does not have a Health Care Directive or Living Will: Discussed advance care planning with patient; however, patient declined at this time.    HPI        8/20/2024   General Health   How would you rate your overall physical health? Good   Feel stress (tense, anxious, or unable to sleep) To some extent      (!) STRESS CONCERN      8/20/2024   Nutrition    Three or more servings of calcium each day? Yes   Diet: Regular (no restrictions)   How many servings of fruit and vegetables per day? (!) 2-3   How many sweetened beverages each day? (!) 2            8/20/2024   Exercise   Days per week of moderate/strenous exercise 2 days      (!) EXERCISE CONCERN      8/20/2024   Social Factors   Frequency of gathering with friends or relatives Twice a week   Worry food won't last until get money to buy more No   Food not last or not have enough money for food? No   Do you have housing? (Housing is defined as stable permanent housing and does not include staying ouside in a car, in a tent, in an abandoned building, in an overnight shelter, or couch-surfing.) Yes   Are you worried about losing your housing? No   Lack of transportation? No   Unable to get utilities (heat,electricity)? No            8/20/2024   Dental   Dentist two times every year? Yes            8/20/2024   TB Screening   Were you born outside of the [place]? No          Today's PHQ-9 Score:       8/22/2024     1:57 PM   PHQ-9 SCORE   PHQ-9 Total Score MyChart 1 (Minimal depression)   PHQ-9 Total Score 1         8/20/2024   Substance Use   Alcohol more than 3/day or more than 7/wk Yes   How often do you have a drink containing alcohol 4 or more times a week   How many alcohol drinks on typical day 3 or 4   How often do you have 5+ drinks at one occasion Less than monthly   Audit 2/3 Score 2   How often not able to stop drinking once started Never   How often failed to do what normally expected Never   How often needed first drink in am after a heavy drinking session Never   How often feeling of guilt or remorse after drinking Never   How often unable to remember what happened the night before Never   Have you or someone else been injured because of your drinking No   Has anyone been concerned or suggested you cut down on drinking No   TOTAL SCORE - AUDIT 6   Do you use any other substances recreationally? (!)  "CAN[employer]ODUCTS        Social History     Tobacco Use    Smoking status: Never    Smokeless tobacco: Never   Vaping Use    Vaping status: Never Used   Substance Use Topics    Alcohol use: Yes     Alcohol/week: 0.0 - 1.7 standard drinks of alcohol    Drug use: Never          Mammogram Screening - Mammogram every 1-2 years updated in Health Maintenance based on mutual decision making        8/20/2024   STI Screening   New sexual partner(s) since last STI/HIV test? No        History of abnormal Pap smear: No - age 30- 64 PAP with HPV every 5 years recommended        3/31/2021    12:00 PM 10/31/2012    12:02 PM   PAP / HPV   PAP (Historical)  NIL    PAP-ABSTRACT See Scanned Document            This result is from an external source.           8/20/2024   Contraception/Family Planning   Questions about contraception or family planning No           Reviewed and updated as needed this visit by Provider                          Review of Systems  Constitutional, HEENT, cardiovascular, pulmonary, gi and gu systems are negative, except as otherwise noted.     Objective    Exam  /64   Pulse 74   Temp 96.9  F (36.1  C)   Resp 16   Ht 1.638 m (5' 4.5\")   Wt 64.3 kg (141 lb 11.2 oz)   LMP 08/13/2024 (Exact Date)   SpO2 99%   BMI 23.95 kg/m     Estimated body mass index is 23.95 kg/m  as calculated from the following:    Height as of this encounter: 1.638 m (5' 4.5\").    Weight as of this encounter: 64.3 kg (141 lb 11.2 oz).    Physical Exam  GENERAL: alert and no distress  EYES: Eyes grossly normal to inspection, PERRL and conjunctivae and sclerae normal  HENT: ear canals and TM's normal, nose and mouth without ulcers or lesions  NECK: no adenopathy, no asymmetry, masses, or scars  RESP: lungs clear to auscultation - no rales, rhonchi or wheezes  CV: regular rate and rhythm, normal S1 S2, no S3 or S4, no murmur, click or rub, no peripheral edema  ABDOMEN: soft, nontender, no hepatosplenomegaly, no masses and bowel " sounds normal  MS: no gross musculoskeletal defects noted, no edema  SKIN: no suspicious lesions or rashes  NEURO: Normal strength and tone, mentation intact and speech normal  PSYCH: mentation appears normal, affect normal/bright        Signed Electronically by: Chandrika Wheeler PA-C    Answers submitted by the patient for this visit:  Patient Health Questionnaire (Submitted on 8/22/2024)  If you checked off any problems, how difficult have these problems made it for you to do your work, take care of things at home, or get along with other people?: Not difficult at all  PHQ9 TOTAL SCORE: 1  Patient Health Questionnaire (G7) (Submitted on 8/22/2024)  FRANK 7 TOTAL SCORE: 1

## 2024-08-22 NOTE — PATIENT INSTRUCTIONS
Patient Education   Preventive Care Advice   This is general advice given by our system to help you stay healthy. However, your care team may have specific advice just for you. Please talk to your care team about your preventive care needs.  Nutrition  Eat 5 or more servings of fruits and vegetables each day.  Try wheat bread, brown rice and whole grain pasta (instead of white bread, rice, and pasta).  Get enough calcium and vitamin D. Check the label on foods and aim for 100% of the RDA (recommended daily allowance).  Lifestyle  Exercise at least 150 minutes each week  (30 minutes a day, 5 days a week).  Do muscle strengthening activities 2 days a week. These help control your weight and prevent disease.  No smoking.  Wear sunscreen to prevent skin cancer.  Have a dental exam and cleaning every 6 months.  Yearly exams  See your health care team every year to talk about:  Any changes in your health.  Any medicines your care team has prescribed.  Preventive care, family planning, and ways to prevent chronic diseases.  Shots (vaccines)   HPV shots (up to age 26), if you've never had them before.  Hepatitis B shots (up to age 59), if you've never had them before.  COVID-19 shot: Get this shot when it's due.  Flu shot: Get a flu shot every year.  Tetanus shot: Get a tetanus shot every 10 years.  Pneumococcal, hepatitis A, and RSV shots: Ask your care team if you need these based on your risk.  Shingles shot (for age 50 and up)  General health tests  Diabetes screening:  Starting at age 35, Get screened for diabetes at least every 3 years.  If you are younger than age 35, ask your care team if you should be screened for diabetes.  Cholesterol test: At age 39, start having a cholesterol test every 5 years, or more often if advised.  Bone density scan (DEXA): At age 50, ask your care team if you should have this scan for osteoporosis (brittle bones).  Hepatitis C: Get tested at least once in your life.  STIs (sexually  transmitted infections)  Before age 24: Ask your care team if you should be screened for STIs.  After age 24: Get screened for STIs if you're at risk. You are at risk for STIs (including HIV) if:  You are sexually active with more than one person.  You don't use condoms every time.  You or a partner was diagnosed with a sexually transmitted infection.  If you are at risk for HIV, ask about PrEP medicine to prevent HIV.  Get tested for HIV at least once in your life, whether you are at risk for HIV or not.  Cancer screening tests  Cervical cancer screening: If you have a cervix, begin getting regular cervical cancer screening tests starting at age 21.  Breast cancer scan (mammogram): If you've ever had breasts, begin having regular mammograms starting at age 40. This is a scan to check for breast cancer.  Colon cancer screening: It is important to start screening for colon cancer at age 45.  Have a colonoscopy test every 10 years (or more often if you're at risk) Or, ask your provider about stool tests like a FIT test every year or Cologuard test every 3 years.  To learn more about your testing options, visit:   .  For help making a decision, visit:   https://bit.ly/aw17425.  Prostate cancer screening test: If you have a prostate, ask your care team if a prostate cancer screening test (PSA) at age 55 is right for you.  Lung cancer screening: If you are a current or former smoker ages 50 to 80, ask your care team if ongoing lung cancer screenings are right for you.  For informational purposes only. Not to replace the advice of your health care provider. Copyright   2023 [facility]. All rights reserved. Clinically reviewed by the [facility] Transitions Program. [employer] 072825 - REV 01/24.  9 Ways to Cut Back on Drinking  Maybe you've found yourself drinking more alcohol than you'd prefer. If you want to cut back, here are some ideas to try.    Think before you drink.  Do you really want a drink,  "or is it just a habit? If you're used to having a drink at a certain time, try doing something else then.     Look for substitutes.  Find some no-alcohol drinks that you enjoy, like flavored seltzer water, tea with honey, or tonic with a slice of lime. Or try alcohol-free beer or \"virgin\" cocktails (without the alcohol).     Drink more water.  Use water to quench your thirst. Drink a glass of water before you have any alcohol. Have another glass along with every drink or between drinks.     Shrink your drink.  For example, have a bottle of beer instead of a pint. Use a smaller glass for wine. Choose drinks with lower alcohol content (ABV%). Or use less liquor and more mixer in cocktails.     Slow down.  It's easy to drink quickly and without thinking about it. Pay attention, and make each drink last longer.     Do the math.  Total up how much you spend on alcohol each month. How much is that a year? If you cut back, what could you do with the money you save?     Take a break.  Choose a day or two each week when you won't drink at all. Notice how you feel on those days, physically and emotionally. How did you sleep? Do you feel better? Over time, add more break days.     Count calories.  Would you like to lose some weight? For some people that's a good motivator for cutting back. Figure out how many calories are in each drink. How many does that add up to in a day? In a week? In a month?     Practice saying no.  Be ready when someone offers you a drink. Try: \"Thanks, I've had enough.\" Or \"Thanks, but I'm cutting back.\" Or \"No, thanks. I feel better when I drink less.\"   Current as of: November 15, 2023  Content Version: 14.1 2006-2024 [employer].   Care instructions adapted under license by your healthcare professional. If you have questions about a medical condition or this instruction, always ask your healthcare professional. [employer] disclaims any warranty or liability for your use " of this information.  Substance Use Disorder: Care Instructions  Overview     You can improve your life and health by stopping your use of alcohol or drugs. When you don't drink or use drugs, you may feel and sleep better. You may get along better with your family, friends, and coworkers. There are medicines and programs that can help with substance use disorder.  How can you care for yourself at home?  Here are some ways to help you stay sober and prevent relapse.  If you have been given medicine to help keep you sober or reduce your cravings, be sure to take it exactly as prescribed.  Talk to your doctor about programs that can help you stop using drugs or drinking alcohol.  Do not keep alcohol or drugs in your home.  Plan ahead. Think about what you'll say if other people ask you to drink or use drugs. Try not to spend time with people who drink or use drugs.  Use the time and money spent on drinking or drugs to do something that's important to you.  Preventing a relapse  Have a plan to deal with relapse. Learn to recognize changes in your thinking that lead you to drink or use drugs. Get help before you start to drink or use drugs again.  Try to stay away from situations, friends, or places that may lead you to drink or use drugs.  If you feel the need to drink alcohol or use drugs again, seek help right away. Call a trusted friend or family member. Some people get support from organizations such as Narcotics Anonymous or [employer] or from treatment facilities.  If you relapse, get help as soon as you can. Some people make a plan with another person that outlines what they want that person to do for them if they relapse. The plan usually includes how to handle the relapse and who to notify in case of relapse.  Don't give up. Remember that a relapse doesn't mean that you have failed. Use the experience to learn the triggers that lead you to drink or use drugs. Then quit again. Recovery is a lifelong process.  "Many people have several relapses before they are able to quit for good.  Follow-up care is a key part of your treatment and safety. Be sure to make and go to all appointments, and call your doctor if you are having problems. It's also a good idea to know your test results and keep a list of the medicines you take.  When should you call for help?   Call 911  anytime you think you may need emergency care. For example, call if you or someone else:    Has overdosed or has withdrawal signs. Be sure to tell the emergency workers that you are or someone else is using or trying to quit using drugs. Overdose or withdrawal signs may include:  Losing consciousness.  Seizure.  Seeing or hearing things that aren't there (hallucinations).     Is thinking or talking about suicide or harming others.   Where to get help 24 hours a day, 7 days a week   If you or someone you know talks about suicide, self-harm, a mental health crisis, a substance use crisis, or any other kind of emotional distress, get help right away. You can:    Call the Suicide and Crisis Lifeline at 988.     Call 5-194-465-LLKQ (1-186.871.2669).     Text HOME to 800821 to access the Crisis Text Line.   Consider saving these numbers in your phone.  Go to [employer]org for more information or to chat online.  Call your doctor now or seek immediate medical care if:    You are having withdrawal symptoms. These may include nausea or vomiting, sweating, shakiness, and anxiety.   Watch closely for changes in your health, and be sure to contact your doctor if:    You have a relapse.     You need more help or support to stop.   Where can you learn more?  Go to https://www.healthLeadformance.net/patiented  Enter H573 in the search box to learn more about \"Substance Use Disorder: Care Instructions.\"  Current as of: November 15, 2023               Content Version: 14.0    2063-1500 [employer].   Care instructions adapted under license by your healthcare professional. " If you have questions about a medical condition or this instruction, always ask your healthcare professional. [employer] disclaims any warranty or liability for your use of this information.

## 2024-08-26 ENCOUNTER — HOSPITAL ENCOUNTER (OUTPATIENT)
Dept: CT IMAGING | Facility: HOSPITAL | Age: 39
Discharge: HOME | End: 2024-08-26
Attending: INTERNAL MEDICINE | Admitting: INTERNAL MEDICINE
Payer: COMMERCIAL

## 2024-08-26 DIAGNOSIS — R91.1 INCIDENTAL LUNG NODULE, GREATER THAN OR EQUAL TO 8MM: ICD-10-CM

## 2024-08-26 PROCEDURE — 71250 CT THORAX DX C-: CPT

## 2024-08-27 ENCOUNTER — LAB (OUTPATIENT)
Dept: LAB | Facility: CLINIC | Age: 39
End: 2024-08-27
Payer: COMMERCIAL

## 2024-08-27 DIAGNOSIS — Z00.00 ROUTINE GENERAL MEDICAL EXAMINATION AT A HEALTH CARE FACILITY: ICD-10-CM

## 2024-08-27 DIAGNOSIS — Z13.29 SCREENING FOR THYROID DISORDER: ICD-10-CM

## 2024-08-27 DIAGNOSIS — Z13.220 LIPID SCREENING: ICD-10-CM

## 2024-08-27 DIAGNOSIS — Z13.1 SCREENING FOR DIABETES MELLITUS: ICD-10-CM

## 2024-08-27 LAB
ANION GAP SERPL CALCULATED.3IONS-SCNC: 9 MMOL/L (ref 7–15)
BUN SERPL-MCNC: 17.8 MG/DL (ref 6–20)
CALCIUM SERPL-MCNC: 9.6 MG/DL (ref 8.8–10.4)
CHLORIDE SERPL-SCNC: 101 MMOL/L (ref 98–107)
CHOLEST SERPL-MCNC: 181 MG/DL
CREAT SERPL-MCNC: 0.74 MG/DL (ref 0.51–0.95)
EGFRCR SERPLBLD CKD-EPI 2021: >90 ML/MIN/1.73M2
ERYTHROCYTE [DISTWIDTH] IN BLOOD BY AUTOMATED COUNT: 13.3 % (ref 10–15)
FASTING STATUS PATIENT QL REPORTED: NORMAL
FASTING STATUS PATIENT QL REPORTED: NORMAL
GLUCOSE SERPL-MCNC: 92 MG/DL (ref 70–99)
HBA1C MFR BLD: 5 % (ref 0–5.6)
HCO3 SERPL-SCNC: 28 MMOL/L (ref 22–29)
HCT VFR BLD AUTO: 39.8 % (ref 35–47)
HDLC SERPL-MCNC: 90 MG/DL
HGB BLD-MCNC: 12.6 G/DL (ref 11.7–15.7)
LDLC SERPL CALC-MCNC: 77 MG/DL
MCH RBC QN AUTO: 26.4 PG (ref 26.5–33)
MCHC RBC AUTO-ENTMCNC: 31.7 G/DL (ref 31.5–36.5)
MCV RBC AUTO: 83 FL (ref 78–100)
NONHDLC SERPL-MCNC: 91 MG/DL
PLATELET # BLD AUTO: 186 10E3/UL (ref 150–450)
POTASSIUM SERPL-SCNC: 4.2 MMOL/L (ref 3.4–5.3)
RBC # BLD AUTO: 4.78 10E6/UL (ref 3.8–5.2)
SODIUM SERPL-SCNC: 138 MMOL/L (ref 135–145)
TRIGL SERPL-MCNC: 72 MG/DL
TSH SERPL DL<=0.005 MIU/L-ACNC: 2.79 UIU/ML (ref 0.3–4.2)
WBC # BLD AUTO: 5.2 10E3/UL (ref 4–11)

## 2024-08-27 PROCEDURE — 80048 BASIC METABOLIC PNL TOTAL CA: CPT

## 2024-08-27 PROCEDURE — 80061 LIPID PANEL: CPT

## 2024-08-27 PROCEDURE — 83036 HEMOGLOBIN GLYCOSYLATED A1C: CPT

## 2024-08-27 PROCEDURE — 84443 ASSAY THYROID STIM HORMONE: CPT

## 2024-08-27 PROCEDURE — 36415 COLL VENOUS BLD VENIPUNCTURE: CPT

## 2024-08-27 PROCEDURE — 85027 COMPLETE CBC AUTOMATED: CPT

## 2024-08-29 ENCOUNTER — OFFICE VISIT (OUTPATIENT)
Dept: PULMONOLOGY | Facility: CLINIC | Age: 39
End: 2024-08-29
Attending: INTERNAL MEDICINE
Payer: COMMERCIAL

## 2024-08-29 VITALS
BODY MASS INDEX: 24.34 KG/M2 | DIASTOLIC BLOOD PRESSURE: 70 MMHG | HEART RATE: 70 BPM | OXYGEN SATURATION: 99 % | SYSTOLIC BLOOD PRESSURE: 124 MMHG | WEIGHT: 144 LBS

## 2024-08-29 DIAGNOSIS — R91.1 LUNG NODULE: Primary | ICD-10-CM

## 2024-08-29 PROCEDURE — 99213 OFFICE O/P EST LOW 20 MIN: CPT | Performed by: INTERNAL MEDICINE

## 2024-08-29 NOTE — PROGRESS NOTES
LUNG NODULE & INTERVENTIONAL PULMONARY CLINIC  [facility], [facility]     Priyanka Madsen MRN# [id number]   Age: 38 year old YOB: 1985       Requesting Physician: Von Plummer DO  [address] [place] [address]       Assessment and Plan:    1. solitary pulmonary lung nodule(s). Given the characteristics on current/previous imaging and risk factors; I would classify this to be Low (<6%) risk for cancer.     Based on most recent radiology read, could consider no further follow-up.  I will review with Priyanka.           History:     Priyanka Madsen is a 38 year old female with sig h/o for family history of early cardiovascular disease who is here for evaluation/followup of lung nodule(s).  This was noted incidentally during a cardiac CT.  She has no pulmonary symptoms.    Works as an  [employer].  Primarily works from home.  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  No other concerning exposures.    - My interpretation of the images relevant for this visit includes: Stable nodule with some calcification.           Past Medical History:      Past Medical History:   Diagnosis Date    Acne     Anemia, iron deficiency     Anxiety     Blood dyscrasia     IgA deficiency (H)            Past Surgical History:      Past Surgical History:   Procedure Laterality Date     SECTION  2013    Procedure:  SECTION;  Primary  Section  *Latex Allergy*;  Surgeon: Rani Ha MD;  Location: [facility] L+D     SECTION      OTHER SURGICAL HISTORY      plantar warts    TONSILLECTOMY      age 5          Social History:     Social History     Tobacco Use    Smoking status: Never    Smokeless tobacco: Never   Substance Use Topics    Alcohol use: Yes     Alcohol/week: 0.0 - 1.7 standard drinks of alcohol          Family History:     Family History   Problem Relation Age of Onset     Thyroid Disease Mother     Hyperlipidemia Mother     Cancer Father     Coronary Artery Disease Father     Breast Cancer Paternal Grandmother     Thyroid Disease Sister     Thyroid Disease Maternal Aunt     Thyroid Disease Maternal Grandmother     Osteoporosis Maternal Grandfather     Coronary Artery Disease Paternal Grandfather            Allergies:      Allergies   Allergen Reactions    Animal Dander Itching    Dust Mites Itching    Mold Itching          Medications:     Current Outpatient Medications   Medication Sig Dispense Refill    busPIRone (BUSPAR) 15 MG tablet Take 1 tablet (15 mg) by mouth 2 times daily. 180 tablet 3    dextromethorphan-guaiFENesin (MUCINEX DM)  MG 12 hr tablet Take 1 tablet by mouth every 12 hours      fluticasone (FLONASE) 50 MCG/ACT nasal spray Spray 1 spray into both nostrils 2 times daily (Patient taking differently: Spray 1 spray into both nostrils as needed.) 9.9 mL 0    nitroFURantoin macrocrystal-monohydrate (MACROBID) 100 MG capsule Take 1 capsule (100 mg) by mouth as needed (prn intercourse) 60 capsule 1    sodium chloride (OCEAN) 0.65 % nasal spray 2 sprays in each nostril 2-4x/day. (Patient taking differently: as needed. 2 sprays in each nostril 2-4x/day.) 30 mL 0    valACYclovir (VALTREX) 1000 mg tablet Take 1 tablet (1,000 mg) by mouth daily as needed       No current facility-administered medications for this visit.          Review of Systems:     See HPI         Physical Exam:   /70   Pulse 70   Wt 65.3 kg (144 lb)   LMP 08/13/2024 (Exact Date)   SpO2 99%   BMI 24.34 kg/m      Constitutional - looks well, in no apparent distress  Eyes - no redness or discharge  Respiratory -breathing appears comfortable. No evident wheezing or stridor  Skin - No appreciable discoloration or lesions (very limited exam)  Neurological - No apparent tremors. Speech fluent and articlate  Psychiatric - no signs of delirium or anxiety          Current Laboratory Data:   All  laboratory and imaging data reviewed.    No results found for this or any previous visit (from the past 24 hour(s)).

## 2024-09-13 ENCOUNTER — HOSPITAL ENCOUNTER (OUTPATIENT)
Dept: MAMMOGRAPHY | Facility: CLINIC | Age: 39
Discharge: HOME | End: 2024-09-13
Attending: PHYSICIAN ASSISTANT | Admitting: PHYSICIAN ASSISTANT
Payer: COMMERCIAL

## 2024-09-13 DIAGNOSIS — Z12.31 ENCOUNTER FOR SCREENING MAMMOGRAM FOR BREAST CANCER: ICD-10-CM

## 2024-09-13 PROCEDURE — 77063 BREAST TOMOSYNTHESIS BI: CPT

## 2024-11-15 DIAGNOSIS — N39.0 RECURRENT UTI: ICD-10-CM

## 2024-11-15 RX ORDER — NITROFURANTOIN 25; 75 MG/1; MG/1
CAPSULE ORAL
Qty: 90 CAPSULE | Refills: 1 | Status: SHIPPED | OUTPATIENT
Start: 2024-11-15

## 2025-04-16 ENCOUNTER — LAB REQUISITION (OUTPATIENT)
Dept: LAB | Facility: CLINIC | Age: 40
End: 2025-04-16

## 2025-04-16 DIAGNOSIS — Z12.4 ENCOUNTER FOR SCREENING FOR MALIGNANT NEOPLASM OF CERVIX: ICD-10-CM

## 2025-04-16 PROCEDURE — G0145 SCR C/V CYTO,THINLAYER,RESCR: HCPCS | Performed by: NURSE PRACTITIONER

## 2025-04-16 PROCEDURE — 87624 HPV HI-RISK TYP POOLED RSLT: CPT | Performed by: NURSE PRACTITIONER

## 2025-04-21 LAB
HPV HR 12 DNA CVX QL NAA+PROBE: NEGATIVE
HPV16 DNA CVX QL NAA+PROBE: NEGATIVE
HPV18 DNA CVX QL NAA+PROBE: NEGATIVE
HUMAN PAPILLOMA VIRUS FINAL DIAGNOSIS: NORMAL

## 2025-04-23 LAB
BKR AP ASSOCIATED HPV REPORT: NORMAL
BKR LAB AP GYN ADEQUACY: NORMAL
BKR LAB AP GYN INTERPRETATION: NORMAL
BKR LAB AP LMP: NORMAL
BKR LAB AP PREVIOUS ABNL DX: NORMAL
BKR LAB AP PREVIOUS ABNORMAL: NORMAL
PATH REPORT.COMMENTS IMP SPEC: NORMAL
PATH REPORT.COMMENTS IMP SPEC: NORMAL
PATH REPORT.RELEVANT HX SPEC: NORMAL

## 2025-07-07 DIAGNOSIS — B00.1 COLD SORE: ICD-10-CM

## 2025-07-07 RX ORDER — VALACYCLOVIR HYDROCHLORIDE 1 G/1
1000 TABLET, FILM COATED ORAL DAILY PRN
Qty: 15 TABLET | Refills: 0 | Status: SHIPPED | OUTPATIENT
Start: 2025-07-07

## 2025-07-31 ASSESSMENT — PATIENT HEALTH QUESTIONNAIRE - PHQ9
10. IF YOU CHECKED OFF ANY PROBLEMS, HOW DIFFICULT HAVE THESE PROBLEMS MADE IT FOR YOU TO DO YOUR WORK, TAKE CARE OF THINGS AT HOME, OR GET ALONG WITH OTHER PEOPLE: NOT DIFFICULT AT ALL
SUM OF ALL RESPONSES TO PHQ QUESTIONS 1-9: 1
SUM OF ALL RESPONSES TO PHQ QUESTIONS 1-9: 1

## 2025-08-01 ENCOUNTER — OFFICE VISIT (OUTPATIENT)
Dept: FAMILY MEDICINE | Facility: CLINIC | Age: 40
End: 2025-08-01
Payer: COMMERCIAL

## 2025-08-01 VITALS
OXYGEN SATURATION: 99 % | TEMPERATURE: 97.9 F | DIASTOLIC BLOOD PRESSURE: 70 MMHG | WEIGHT: 152.9 LBS | HEIGHT: 64 IN | RESPIRATION RATE: 20 BRPM | BODY MASS INDEX: 26.1 KG/M2 | SYSTOLIC BLOOD PRESSURE: 110 MMHG | HEART RATE: 61 BPM

## 2025-08-01 DIAGNOSIS — Z13.1 SCREENING FOR DIABETES MELLITUS: ICD-10-CM

## 2025-08-01 DIAGNOSIS — L65.9 HAIR LOSS: Primary | ICD-10-CM

## 2025-08-01 LAB
ALBUMIN SERPL BCG-MCNC: 4.7 G/DL (ref 3.5–5.2)
ALP SERPL-CCNC: 37 U/L (ref 40–150)
ALT SERPL W P-5'-P-CCNC: 20 U/L (ref 0–50)
ANION GAP SERPL CALCULATED.3IONS-SCNC: 11 MMOL/L (ref 7–15)
AST SERPL W P-5'-P-CCNC: 18 U/L (ref 0–45)
BILIRUB SERPL-MCNC: 1 MG/DL
BUN SERPL-MCNC: 16.3 MG/DL (ref 6–20)
CALCIUM SERPL-MCNC: 9.8 MG/DL (ref 8.8–10.4)
CHLORIDE SERPL-SCNC: 101 MMOL/L (ref 98–107)
CREAT SERPL-MCNC: 0.8 MG/DL (ref 0.51–0.95)
EGFRCR SERPLBLD CKD-EPI 2021: >90 ML/MIN/1.73M2
ERYTHROCYTE [DISTWIDTH] IN BLOOD BY AUTOMATED COUNT: 14.6 % (ref 10–15)
EST. AVERAGE GLUCOSE BLD GHB EST-MCNC: 97 MG/DL
FERRITIN SERPL-MCNC: 246 NG/ML (ref 6–175)
GLUCOSE SERPL-MCNC: 88 MG/DL (ref 70–99)
HBA1C MFR BLD: 5 % (ref 0–5.6)
HCO3 SERPL-SCNC: 24 MMOL/L (ref 22–29)
HCT VFR BLD AUTO: 42 % (ref 35–47)
HGB BLD-MCNC: 13.3 G/DL (ref 11.7–15.7)
MCH RBC QN AUTO: 26.4 PG (ref 26.5–33)
MCHC RBC AUTO-ENTMCNC: 31.7 G/DL (ref 31.5–36.5)
MCV RBC AUTO: 83 FL (ref 78–100)
PLATELET # BLD AUTO: 228 10E3/UL (ref 150–450)
POTASSIUM SERPL-SCNC: 4.5 MMOL/L (ref 3.4–5.3)
PROT SERPL-MCNC: 7.3 G/DL (ref 6.4–8.3)
RBC # BLD AUTO: 5.04 10E6/UL (ref 3.8–5.2)
SODIUM SERPL-SCNC: 136 MMOL/L (ref 135–145)
T4 FREE SERPL-MCNC: 1.35 NG/DL (ref 0.9–1.7)
TSH SERPL DL<=0.005 MIU/L-ACNC: 2.89 UIU/ML (ref 0.3–4.2)
VIT D+METAB SERPL-MCNC: 27 NG/ML (ref 20–50)
WBC # BLD AUTO: 6.8 10E3/UL (ref 4–11)

## 2025-08-01 PROCEDURE — 84443 ASSAY THYROID STIM HORMONE: CPT | Performed by: PHYSICIAN ASSISTANT

## 2025-08-01 PROCEDURE — 99213 OFFICE O/P EST LOW 20 MIN: CPT | Performed by: PHYSICIAN ASSISTANT

## 2025-08-01 PROCEDURE — 83036 HEMOGLOBIN GLYCOSYLATED A1C: CPT | Performed by: PHYSICIAN ASSISTANT

## 2025-08-01 PROCEDURE — 80053 COMPREHEN METABOLIC PANEL: CPT | Performed by: PHYSICIAN ASSISTANT

## 2025-08-01 PROCEDURE — 3074F SYST BP LT 130 MM HG: CPT | Performed by: PHYSICIAN ASSISTANT

## 2025-08-01 PROCEDURE — 82306 VITAMIN D 25 HYDROXY: CPT | Performed by: PHYSICIAN ASSISTANT

## 2025-08-01 PROCEDURE — 3078F DIAST BP <80 MM HG: CPT | Performed by: PHYSICIAN ASSISTANT

## 2025-08-01 PROCEDURE — 86376 MICROSOMAL ANTIBODY EACH: CPT | Performed by: PHYSICIAN ASSISTANT

## 2025-08-01 PROCEDURE — 84439 ASSAY OF FREE THYROXINE: CPT | Performed by: PHYSICIAN ASSISTANT

## 2025-08-01 PROCEDURE — 82728 ASSAY OF FERRITIN: CPT | Performed by: PHYSICIAN ASSISTANT

## 2025-08-01 PROCEDURE — 85027 COMPLETE CBC AUTOMATED: CPT | Performed by: PHYSICIAN ASSISTANT

## 2025-08-01 PROCEDURE — 36415 COLL VENOUS BLD VENIPUNCTURE: CPT | Performed by: PHYSICIAN ASSISTANT

## 2025-08-01 PROCEDURE — 3044F HG A1C LEVEL LT 7.0%: CPT | Performed by: PHYSICIAN ASSISTANT

## 2025-08-01 PROCEDURE — 82977 ASSAY OF GGT: CPT | Performed by: PHYSICIAN ASSISTANT

## 2025-08-01 PROCEDURE — 83735 ASSAY OF MAGNESIUM: CPT | Performed by: PHYSICIAN ASSISTANT

## 2025-08-01 RX ORDER — FLUCONAZOLE 150 MG/1
TABLET ORAL
COMMUNITY
Start: 2025-04-16

## 2025-08-03 DIAGNOSIS — N39.0 RECURRENT UTI: ICD-10-CM

## 2025-08-03 RX ORDER — NITROFURANTOIN 25; 75 MG/1; MG/1
CAPSULE ORAL
Qty: 90 CAPSULE | Refills: 1 | Status: SHIPPED | OUTPATIENT
Start: 2025-08-03

## 2025-08-04 ENCOUNTER — LAB (OUTPATIENT)
Dept: LAB | Facility: CLINIC | Age: 40
End: 2025-08-04
Payer: COMMERCIAL

## 2025-08-04 ENCOUNTER — RESULTS FOLLOW-UP (OUTPATIENT)
Dept: FAMILY MEDICINE | Facility: CLINIC | Age: 40
End: 2025-08-04

## 2025-08-04 DIAGNOSIS — R79.89 ABNORMAL CBC: ICD-10-CM

## 2025-08-04 DIAGNOSIS — R79.89 ELEVATED FERRITIN: ICD-10-CM

## 2025-08-04 DIAGNOSIS — L65.9 HAIR LOSS: ICD-10-CM

## 2025-08-04 DIAGNOSIS — L65.9 HAIR LOSS: Primary | ICD-10-CM

## 2025-08-04 DIAGNOSIS — Z83.49 FH: HEMOCHROMATOSIS: ICD-10-CM

## 2025-08-04 LAB
GGT SERPL-CCNC: 22 U/L (ref 5–36)
MAGNESIUM SERPL-MCNC: 2.3 MG/DL (ref 1.7–2.3)
THYROPEROXIDASE AB SERPL-ACNC: 11 IU/ML

## 2025-08-04 PROCEDURE — 83540 ASSAY OF IRON: CPT

## 2025-08-04 PROCEDURE — 84630 ASSAY OF ZINC: CPT | Mod: 90

## 2025-08-04 PROCEDURE — 82607 VITAMIN B-12: CPT

## 2025-08-04 PROCEDURE — 84466 ASSAY OF TRANSFERRIN: CPT

## 2025-08-04 PROCEDURE — 99000 SPECIMEN HANDLING OFFICE-LAB: CPT

## 2025-08-04 PROCEDURE — 36415 COLL VENOUS BLD VENIPUNCTURE: CPT

## 2025-08-04 PROCEDURE — 83550 IRON BINDING TEST: CPT | Mod: 59

## 2025-08-05 LAB — VIT B12 SERPL-MCNC: 650 PG/ML (ref 232–1245)

## 2025-08-06 ENCOUNTER — PATIENT OUTREACH (OUTPATIENT)
Dept: ONCOLOGY | Facility: CLINIC | Age: 40
End: 2025-08-06
Payer: COMMERCIAL

## 2025-08-06 LAB
IRON BINDING CAPACITY (ROCHE): 249 UG/DL (ref 240–430)
IRON SATN MFR SERPL: 49 % (ref 15–46)
IRON SERPL-MCNC: 121 UG/DL (ref 37–145)
TRANSFERRIN SERPL-MCNC: 209 MG/DL (ref 200–360)

## 2025-08-07 ENCOUNTER — LAB (OUTPATIENT)
Dept: LAB | Facility: CLINIC | Age: 40
End: 2025-08-07
Payer: COMMERCIAL

## 2025-08-07 DIAGNOSIS — Z83.49 FH: HEMOCHROMATOSIS: ICD-10-CM

## 2025-08-07 DIAGNOSIS — R79.89 ELEVATED FERRITIN: ICD-10-CM

## 2025-08-07 LAB
BASOPHILS # BLD AUTO: 0 10E3/UL (ref 0–0.2)
BASOPHILS NFR BLD AUTO: 0 %
EOSINOPHIL # BLD AUTO: 0.7 10E3/UL (ref 0–0.7)
EOSINOPHIL NFR BLD AUTO: 13 %
ERYTHROCYTE [DISTWIDTH] IN BLOOD BY AUTOMATED COUNT: 14.3 % (ref 10–15)
FERRITIN SERPL-MCNC: 225 NG/ML (ref 6–175)
HCT VFR BLD AUTO: 39.8 % (ref 35–47)
HGB BLD-MCNC: 12.4 G/DL (ref 11.7–15.7)
IMM GRANULOCYTES # BLD: 0 10E3/UL
IMM GRANULOCYTES NFR BLD: 0 %
LYMPHOCYTES # BLD AUTO: 1.6 10E3/UL (ref 0.8–5.3)
LYMPHOCYTES NFR BLD AUTO: 30 %
MCH RBC QN AUTO: 25.8 PG (ref 26.5–33)
MCHC RBC AUTO-ENTMCNC: 31.2 G/DL (ref 31.5–36.5)
MCV RBC AUTO: 83 FL (ref 78–100)
MONOCYTES # BLD AUTO: 0.5 10E3/UL (ref 0–1.3)
MONOCYTES NFR BLD AUTO: 8 %
NEUTROPHILS # BLD AUTO: 2.7 10E3/UL (ref 1.6–8.3)
NEUTROPHILS NFR BLD AUTO: 48 %
PLATELET # BLD AUTO: 215 10E3/UL (ref 150–450)
RBC # BLD AUTO: 4.8 10E6/UL (ref 3.8–5.2)
WBC # BLD AUTO: 5.5 10E3/UL (ref 4–11)
ZINC SERPL-MCNC: 63.5 UG/DL

## 2025-08-25 ENCOUNTER — PATIENT OUTREACH (OUTPATIENT)
Dept: CARE COORDINATION | Facility: CLINIC | Age: 40
End: 2025-08-25
Payer: COMMERCIAL